# Patient Record
Sex: FEMALE | Race: BLACK OR AFRICAN AMERICAN | NOT HISPANIC OR LATINO
[De-identification: names, ages, dates, MRNs, and addresses within clinical notes are randomized per-mention and may not be internally consistent; named-entity substitution may affect disease eponyms.]

---

## 2017-04-02 ENCOUNTER — TRANSCRIPTION ENCOUNTER (OUTPATIENT)
Age: 62
End: 2017-04-02

## 2017-11-08 ENCOUNTER — TRANSCRIPTION ENCOUNTER (OUTPATIENT)
Age: 62
End: 2017-11-08

## 2019-02-11 ENCOUNTER — INPATIENT (INPATIENT)
Facility: HOSPITAL | Age: 64
LOS: 2 days | Discharge: HOME | End: 2019-02-14
Attending: FAMILY MEDICINE | Admitting: FAMILY MEDICINE
Payer: COMMERCIAL

## 2019-02-11 VITALS
TEMPERATURE: 100 F | SYSTOLIC BLOOD PRESSURE: 156 MMHG | DIASTOLIC BLOOD PRESSURE: 100 MMHG | RESPIRATION RATE: 16 BRPM | OXYGEN SATURATION: 97 % | HEART RATE: 113 BPM

## 2019-02-11 LAB
ALBUMIN SERPL ELPH-MCNC: 3.7 G/DL — SIGNIFICANT CHANGE UP (ref 3.3–5)
ALP SERPL-CCNC: 134 U/L — HIGH (ref 40–120)
ALT FLD-CCNC: 34 U/L — SIGNIFICANT CHANGE UP (ref 12–78)
ANION GAP SERPL CALC-SCNC: 9 MMOL/L — SIGNIFICANT CHANGE UP (ref 5–17)
APPEARANCE UR: CLEAR — SIGNIFICANT CHANGE UP
APTT BLD: 30.4 SEC — SIGNIFICANT CHANGE UP (ref 27.5–36.3)
AST SERPL-CCNC: 33 U/L — SIGNIFICANT CHANGE UP (ref 15–37)
BACTERIA # UR AUTO: NEGATIVE — SIGNIFICANT CHANGE UP
BASOPHILS # BLD AUTO: 0.03 K/UL — SIGNIFICANT CHANGE UP (ref 0–0.2)
BASOPHILS NFR BLD AUTO: 0.4 % — SIGNIFICANT CHANGE UP (ref 0–2)
BILIRUB SERPL-MCNC: 0.7 MG/DL — SIGNIFICANT CHANGE UP (ref 0.2–1.2)
BILIRUB UR-MCNC: NEGATIVE — SIGNIFICANT CHANGE UP
BUN SERPL-MCNC: 19 MG/DL — SIGNIFICANT CHANGE UP (ref 7–23)
CALCIUM SERPL-MCNC: 9.5 MG/DL — SIGNIFICANT CHANGE UP (ref 8.5–10.1)
CHLORIDE SERPL-SCNC: 99 MMOL/L — SIGNIFICANT CHANGE UP (ref 96–108)
CO2 SERPL-SCNC: 28 MMOL/L — SIGNIFICANT CHANGE UP (ref 22–31)
COLOR SPEC: YELLOW — SIGNIFICANT CHANGE UP
CREAT SERPL-MCNC: 1.23 MG/DL — SIGNIFICANT CHANGE UP (ref 0.5–1.3)
DIFF PNL FLD: ABNORMAL
EOSINOPHIL # BLD AUTO: 0.14 K/UL — SIGNIFICANT CHANGE UP (ref 0–0.5)
EOSINOPHIL NFR BLD AUTO: 1.8 % — SIGNIFICANT CHANGE UP (ref 0–6)
EPI CELLS # UR: NEGATIVE — SIGNIFICANT CHANGE UP
GLUCOSE SERPL-MCNC: 312 MG/DL — HIGH (ref 70–99)
GLUCOSE UR QL: 1000 MG/DL
HCT VFR BLD CALC: 43.8 % — SIGNIFICANT CHANGE UP (ref 34.5–45)
HGB BLD-MCNC: 14.8 G/DL — SIGNIFICANT CHANGE UP (ref 11.5–15.5)
IMM GRANULOCYTES NFR BLD AUTO: 0.8 % — SIGNIFICANT CHANGE UP (ref 0–1.5)
INR BLD: 0.93 RATIO — SIGNIFICANT CHANGE UP (ref 0.88–1.16)
KETONES UR-MCNC: ABNORMAL
LEUKOCYTE ESTERASE UR-ACNC: NEGATIVE — SIGNIFICANT CHANGE UP
LYMPHOCYTES # BLD AUTO: 1.09 K/UL — SIGNIFICANT CHANGE UP (ref 1–3.3)
LYMPHOCYTES # BLD AUTO: 14.4 % — SIGNIFICANT CHANGE UP (ref 13–44)
MCHC RBC-ENTMCNC: 29.1 PG — SIGNIFICANT CHANGE UP (ref 27–34)
MCHC RBC-ENTMCNC: 33.8 GM/DL — SIGNIFICANT CHANGE UP (ref 32–36)
MCV RBC AUTO: 86.2 FL — SIGNIFICANT CHANGE UP (ref 80–100)
MONOCYTES # BLD AUTO: 0.27 K/UL — SIGNIFICANT CHANGE UP (ref 0–0.9)
MONOCYTES NFR BLD AUTO: 3.6 % — SIGNIFICANT CHANGE UP (ref 2–14)
NEUTROPHILS # BLD AUTO: 5.99 K/UL — SIGNIFICANT CHANGE UP (ref 1.8–7.4)
NEUTROPHILS NFR BLD AUTO: 79 % — HIGH (ref 43–77)
NITRITE UR-MCNC: NEGATIVE — SIGNIFICANT CHANGE UP
NRBC # BLD: 0 /100 WBCS — SIGNIFICANT CHANGE UP (ref 0–0)
PH UR: 8 — SIGNIFICANT CHANGE UP (ref 5–8)
PLATELET # BLD AUTO: 136 K/UL — LOW (ref 150–400)
POTASSIUM SERPL-MCNC: 3.8 MMOL/L — SIGNIFICANT CHANGE UP (ref 3.5–5.3)
POTASSIUM SERPL-SCNC: 3.8 MMOL/L — SIGNIFICANT CHANGE UP (ref 3.5–5.3)
PROT SERPL-MCNC: 8.4 GM/DL — HIGH (ref 6–8.3)
PROT UR-MCNC: NEGATIVE — SIGNIFICANT CHANGE UP
PROTHROM AB SERPL-ACNC: 10.3 SEC — SIGNIFICANT CHANGE UP (ref 10–12.9)
RBC # BLD: 5.08 M/UL — SIGNIFICANT CHANGE UP (ref 3.8–5.2)
RBC # FLD: 12.2 % — SIGNIFICANT CHANGE UP (ref 10.3–14.5)
RBC CASTS # UR COMP ASSIST: ABNORMAL /HPF (ref 0–4)
SODIUM SERPL-SCNC: 136 MMOL/L — SIGNIFICANT CHANGE UP (ref 135–145)
SP GR SPEC: 1.01 — SIGNIFICANT CHANGE UP (ref 1.01–1.02)
TROPONIN I SERPL-MCNC: <0.015 NG/ML — SIGNIFICANT CHANGE UP (ref 0.01–0.04)
UROBILINOGEN FLD QL: NEGATIVE — SIGNIFICANT CHANGE UP
WBC # BLD: 7.58 K/UL — SIGNIFICANT CHANGE UP (ref 3.8–10.5)
WBC # FLD AUTO: 7.58 K/UL — SIGNIFICANT CHANGE UP (ref 3.8–10.5)
WBC UR QL: SIGNIFICANT CHANGE UP

## 2019-02-11 PROCEDURE — 99285 EMERGENCY DEPT VISIT HI MDM: CPT

## 2019-02-11 PROCEDURE — 93010 ELECTROCARDIOGRAM REPORT: CPT

## 2019-02-11 PROCEDURE — 70450 CT HEAD/BRAIN W/O DYE: CPT | Mod: 26

## 2019-02-11 PROCEDURE — 71045 X-RAY EXAM CHEST 1 VIEW: CPT | Mod: 26

## 2019-02-11 RX ORDER — ATORVASTATIN CALCIUM 80 MG/1
40 TABLET, FILM COATED ORAL AT BEDTIME
Qty: 0 | Refills: 0 | Status: DISCONTINUED | OUTPATIENT
Start: 2019-02-11 | End: 2019-02-14

## 2019-02-11 RX ORDER — ASPIRIN/CALCIUM CARB/MAGNESIUM 324 MG
81 TABLET ORAL DAILY
Qty: 0 | Refills: 0 | Status: DISCONTINUED | OUTPATIENT
Start: 2019-02-11 | End: 2019-02-14

## 2019-02-11 RX ORDER — ACETAMINOPHEN 500 MG
650 TABLET ORAL ONCE
Qty: 0 | Refills: 0 | Status: COMPLETED | OUTPATIENT
Start: 2019-02-11 | End: 2019-02-11

## 2019-02-11 RX ORDER — HEPARIN SODIUM 5000 [USP'U]/ML
5000 INJECTION INTRAVENOUS; SUBCUTANEOUS EVERY 8 HOURS
Qty: 0 | Refills: 0 | Status: DISCONTINUED | OUTPATIENT
Start: 2019-02-11 | End: 2019-02-14

## 2019-02-11 RX ORDER — ASPIRIN/CALCIUM CARB/MAGNESIUM 324 MG
325 TABLET ORAL ONCE
Qty: 0 | Refills: 0 | Status: COMPLETED | OUTPATIENT
Start: 2019-02-11 | End: 2019-02-11

## 2019-02-11 RX ADMIN — Medication 650 MILLIGRAM(S): at 19:23

## 2019-02-11 RX ADMIN — Medication 325 MILLIGRAM(S): at 20:03

## 2019-02-11 RX ADMIN — Medication 650 MILLIGRAM(S): at 20:06

## 2019-02-11 NOTE — ED PROVIDER NOTE - OBJECTIVE STATEMENT
64 y/o female with a PMHx of DM, hypothyroidism, HTN, CVA (41 years ago, taken off ASA) presents to the ED c/o HA x 2 days, worsening today. Pt reports she has been intermittently noncompliant with her HTN meds x2 months. Pt took Tylenol yesterday with relief of HA, no pain meds today. Yesterday, pt had trouble speaking, states she could not formulate a sentence. Daughter notes pt  sometimes be disoriented but this episode was different and longer in length. Denies CP or any other symptoms in ED. NKDA. PCP Dr. Hong

## 2019-02-11 NOTE — ED STATDOCS - PROGRESS NOTE DETAILS
Dimas URIBE for ED attending, Dr. Wilder: 62 y/o female with a PMHx of CVA, DM, HTN (not taking her HTN medications for the past 2 months) presents to the ED c/o word finding difficulty, disorientation, generalized weakness since yesterday. 2 days ago pt had episode of nausea, vomiting with a HA after eating eggs. Today pt had onset of right finger stiffness. No pain at time of evaluation. Will send pt to main ED for further evaluation.

## 2019-02-11 NOTE — ED ADULT NURSE REASSESSMENT NOTE - NS ED NURSE REASSESS COMMENT FT1
report received from previous RN. patient resting comfortably in stretcher in no acute distress. A&Ox4 but forgetful at times. patient reports she is in ED for confusion that began over the weekend and difficulty forming complete sentences. PERRL. moves all extremities with good strength. no facial droop or slurred speech noted. +headache. denies dizziness, blurred vision or numbness/tingling of the extremities. color good. skin warm and dry. respirations even and unlabored. sinus tachycardia 100s on CM. passed dysphagia screen. medicated as ordered. VSS. as per Dr. Abernathy, patient may eat. provided with sandwich. call bell within reach. family at bedside. pending repeat troponin.

## 2019-02-11 NOTE — ED ADULT TRIAGE NOTE - CHIEF COMPLAINT QUOTE
pt BIB daughter from home c/o disorientation, weakness x 3 days. last known well Friday. daughter reports pt woke up yesterday morning disoriented and word-searching but felt better throughout the day. pt now called daughter bc she feels 'unwell' but cannot elaborate. at triage, pt lethargic and weak.  strength equal bilaterally. no gross asymmetry noted. no code stroke assessed in setting of last known well. hx CVA 15 yrs ago.

## 2019-02-11 NOTE — H&P ADULT - NSHPPHYSICALEXAM_GEN_ALL_CORE
ICU Vital Signs Last 24 Hrs    T(F): 99.6 (11 Feb 2019 16:39), Max: 99.6 (11 Feb 2019 16:39)  HR: 108 (11 Feb 2019 19:23) (107 - 113)  BP: 150/85 (11 Feb 2019 19:23) (150/85 - 156/100)    RR: 17 (11 Feb 2019 19:23) (16 - 21)  SpO2: 98% (11 Feb 2019 19:23) (97% - 98%)

## 2019-02-11 NOTE — ED STATDOCS - NS_ ATTENDINGSCRIBEDETAILS _ED_A_ED_FT
Antwan Wilder DO (Attending): The history, relevant review of systems, past medical and surgical history, medical decision making, and physical examination was documented by the scribe in my presence and I attest to the accuracy of the documentation. I, Antwan Wilder DO, performed the initial face to face bedside interview with this patient regarding history of present illness and determined that the patient should be seen in the main ED.  The history, was documented by the scribe in my presence and I attest to the accuracy of the documentation.

## 2019-02-11 NOTE — H&P ADULT - HISTORY OF PRESENT ILLNESS
62 y/o woman with a PMHx of DM, hypothyroidism, HTN, CVA (41 years ago, taken off ASA) presents to the ED c/o HA x 2 days, worsening today. Pt reports she has been intermittently noncompliant with her HTN meds x2 months. Pt took Tylenol yesterday with relief of HA, no pain meds today. Yesterday, pt had trouble speaking, states she could not formulate a sentence. Daughter notes pt  sometimes be disoriented but this episode was different and longer in length. Denies CP or any other symptoms in ED. NKDA. Pt is a 62 y/o woman with a PMHx of DM, hypothyroidism, HTN, CVA (21 years ago, taken off ASA and coumadin 15 years ago) who presents to the ED c/o recurrent speech difficulty and Left index finger numbness , HA over the last  3 days. Pt had stopped taking all her meds when she went to [place] 6 months ago to bury her father.  Pt took Tylenol yesterday with relief of HA, no pain meds today.  Pt reports trouble speaking 2 days ago at 10am lasting 20-25 min, states she could not formulate a sentence.   This again occurred today with Left index numbess at 3pm , lasting 20 min Pt denies CP, palpations or any other symptoms.     Surg Hx:  WADE    FamHx:  Mother DM, HTN, Asthma,  in 60s  Father  at 74 of old age  Brother CVA at 51  Sister DM 51

## 2019-02-11 NOTE — ED ADULT NURSE REASSESSMENT NOTE - NS ED NURSE REASSESS COMMENT FT1
Dr. Abernathy states ok for family to give patient her night time medications from home. medication reconciliation completed in computer.

## 2019-02-11 NOTE — ED PROVIDER NOTE - NS_ ATTENDINGSCRIBEDETAILS _ED_A_ED_FT
I, Ghulam Abernathy MD,  performed the initial face to face bedside interview with this patient regarding history of present illness, review of symptoms and relevant past medical, social and family history.  I completed an independent physical examination.    The history, relevant review of systems, past medical and surgical history, medical decision making, and physical examination was documented by the scribe in my presence and I attest to the accuracy of the documentation.

## 2019-02-11 NOTE — H&P ADULT - ASSESSMENT
Pt is admitted w/    I. CVA Aphasia and headache, NIHSS 0  - adm to tele  - s/p ASA 325mg, add  statin  - Neurochecks  - echo  - fasting lipids    II. DM  - Insulin sliding scale    III. DVT prophylaxis  - heparin    IV. IMPROVE VTE Individual Risk Assessment    RISK                                                                Points    [  ] Previous VTE                                                  3    [  ] Thrombophilia                                               2    [  ] Lower limb paralysis                                      2        (unable to hold up >15 seconds)      [  ] Current Cancer                                              2         (within 6 months)    [  ] Immobilization > 24 hrs                                1    [  ] ICU/CCU stay > 24 hours                              1    [ X ] Age > 60                                                      1    IMPROVE VTE Score _______1__ Pt is a 62 y/o woman with a PMHx of DM, hypothyroidism, HTN, CVA (21 years ago, taken off ASA and coumadin 15 years ago) who presents to the ED c/o recurrent speech difficulty and Left index finger numbness , HA over the last  3 days. Pt had stopped taking all her meds when she went to [place] 6 months ago to bury her father.  Pt took Tylenol yesterday with relief of HA, no pain meds today.  Pt reports trouble speaking 2 days ago at 10am lasting 20-25 min, states she could not formulate a sentence.   This again occurred today with Left index numbess at 3pm , lasting 20 min Pt denies CP, palpations or any other symptoms.     Pt is admitted w/    I. CVA DDX TIA Aphasia and headache, NIHSS 0  - adm to tele  - s/p ASA 325mg in ED,   - resume   statin now  and plavix tomorrow, hold further ASA, as pt was not on any of her meds for 6 mo  - Neurochecks  - echo  - fasting lipids  - Neurology consult  - pt and  at bedside counseled re: compliance with meds, and risk of CVA and morbidity    II. DM  - Insulin sliding scale    III. DVT prophylaxis  - heparin    IV. IMPROVE VTE Individual Risk Assessment    RISK                                                                Points    [  ] Previous VTE                                                  3    [  ] Thrombophilia                                               2    [  ] Lower limb paralysis                                      2        (unable to hold up >15 seconds)      [  ] Current Cancer                                              2         (within 6 months)    [  ] Immobilization > 24 hrs                                1    [  ] ICU/CCU stay > 24 hours                              1    [ X ] Age > 60                                                      1    IMPROVE VTE Score _______1__    V. Meds  - pt needs assistance and review of meds with PCP required  - pt lists both labetalol and propranolol as well as 2 calcium channel blockers and 2 different doses of metformin

## 2019-02-12 LAB
CHOLEST SERPL-MCNC: 246 MG/DL — HIGH (ref 10–199)
GLUCOSE BLDC GLUCOMTR-MCNC: 219 MG/DL — HIGH (ref 70–99)
GLUCOSE BLDC GLUCOMTR-MCNC: 272 MG/DL — HIGH (ref 70–99)
GLUCOSE BLDC GLUCOMTR-MCNC: 299 MG/DL — HIGH (ref 70–99)
GLUCOSE BLDC GLUCOMTR-MCNC: 301 MG/DL — HIGH (ref 70–99)
GLUCOSE BLDC GLUCOMTR-MCNC: 363 MG/DL — HIGH (ref 70–99)
HBA1C BLD-MCNC: 15.4 % — HIGH (ref 4–5.6)
HCV AB S/CO SERPL IA: 0.1 S/CO — SIGNIFICANT CHANGE UP
HCV AB SERPL-IMP: SIGNIFICANT CHANGE UP
HDLC SERPL-MCNC: 40 MG/DL — LOW
LIPID PNL WITH DIRECT LDL SERPL: 160 MG/DL — HIGH
TOTAL CHOLESTEROL/HDL RATIO MEASUREMENT: 6.2 RATIO — SIGNIFICANT CHANGE UP (ref 3.3–7.1)
TRIGL SERPL-MCNC: 229 MG/DL — HIGH (ref 10–149)
TSH SERPL-MCNC: 6.66 UU/ML — HIGH (ref 0.34–4.82)

## 2019-02-12 PROCEDURE — 70551 MRI BRAIN STEM W/O DYE: CPT | Mod: 26

## 2019-02-12 PROCEDURE — 72198 MR ANGIO PELVIS W/O & W/DYE: CPT | Mod: 26

## 2019-02-12 PROCEDURE — 70544 MR ANGIOGRAPHY HEAD W/O DYE: CPT | Mod: 26,59

## 2019-02-12 RX ORDER — INSULIN GLARGINE 100 [IU]/ML
12 INJECTION, SOLUTION SUBCUTANEOUS EVERY MORNING
Qty: 0 | Refills: 0 | Status: DISCONTINUED | OUTPATIENT
Start: 2019-02-12 | End: 2019-02-13

## 2019-02-12 RX ORDER — LABETALOL HCL 100 MG
200 TABLET ORAL
Qty: 0 | Refills: 0 | Status: DISCONTINUED | OUTPATIENT
Start: 2019-02-12 | End: 2019-02-14

## 2019-02-12 RX ORDER — SODIUM CHLORIDE 9 MG/ML
1000 INJECTION, SOLUTION INTRAVENOUS
Qty: 0 | Refills: 0 | Status: DISCONTINUED | OUTPATIENT
Start: 2019-02-12 | End: 2019-02-12

## 2019-02-12 RX ORDER — INFLUENZA VIRUS VACCINE 15; 15; 15; 15 UG/.5ML; UG/.5ML; UG/.5ML; UG/.5ML
0.5 SUSPENSION INTRAMUSCULAR ONCE
Qty: 0 | Refills: 0 | Status: DISCONTINUED | OUTPATIENT
Start: 2019-02-12 | End: 2019-02-14

## 2019-02-12 RX ORDER — LOSARTAN POTASSIUM 100 MG/1
100 TABLET, FILM COATED ORAL DAILY
Qty: 0 | Refills: 0 | Status: DISCONTINUED | OUTPATIENT
Start: 2019-02-12 | End: 2019-02-14

## 2019-02-12 RX ORDER — SODIUM CHLORIDE 9 MG/ML
500 INJECTION INTRAMUSCULAR; INTRAVENOUS; SUBCUTANEOUS ONCE
Qty: 0 | Refills: 0 | Status: COMPLETED | OUTPATIENT
Start: 2019-02-12 | End: 2019-02-12

## 2019-02-12 RX ORDER — DEXTROSE 50 % IN WATER 50 %
25 SYRINGE (ML) INTRAVENOUS ONCE
Qty: 0 | Refills: 0 | Status: DISCONTINUED | OUTPATIENT
Start: 2019-02-12 | End: 2019-02-12

## 2019-02-12 RX ORDER — CLOPIDOGREL BISULFATE 75 MG/1
75 TABLET, FILM COATED ORAL DAILY
Qty: 0 | Refills: 0 | Status: DISCONTINUED | OUTPATIENT
Start: 2019-02-12 | End: 2019-02-14

## 2019-02-12 RX ORDER — AMLODIPINE BESYLATE 2.5 MG/1
10 TABLET ORAL DAILY
Qty: 0 | Refills: 0 | Status: DISCONTINUED | OUTPATIENT
Start: 2019-02-12 | End: 2019-02-14

## 2019-02-12 RX ORDER — LEVOTHYROXINE SODIUM 125 MCG
75 TABLET ORAL DAILY
Qty: 0 | Refills: 0 | Status: DISCONTINUED | OUTPATIENT
Start: 2019-02-12 | End: 2019-02-14

## 2019-02-12 RX ORDER — GLUCAGON INJECTION, SOLUTION 0.5 MG/.1ML
1 INJECTION, SOLUTION SUBCUTANEOUS ONCE
Qty: 0 | Refills: 0 | Status: DISCONTINUED | OUTPATIENT
Start: 2019-02-12 | End: 2019-02-12

## 2019-02-12 RX ORDER — INSULIN LISPRO 100/ML
VIAL (ML) SUBCUTANEOUS
Qty: 0 | Refills: 0 | Status: DISCONTINUED | OUTPATIENT
Start: 2019-02-12 | End: 2019-02-14

## 2019-02-12 RX ORDER — DEXTROSE 50 % IN WATER 50 %
15 SYRINGE (ML) INTRAVENOUS ONCE
Qty: 0 | Refills: 0 | Status: DISCONTINUED | OUTPATIENT
Start: 2019-02-12 | End: 2019-02-12

## 2019-02-12 RX ORDER — METFORMIN HYDROCHLORIDE 850 MG/1
1 TABLET ORAL
Qty: 0 | Refills: 0 | COMMUNITY

## 2019-02-12 RX ORDER — PROPRANOLOL HCL 160 MG
10 CAPSULE, EXTENDED RELEASE 24HR ORAL
Qty: 0 | Refills: 0 | Status: DISCONTINUED | OUTPATIENT
Start: 2019-02-12 | End: 2019-02-12

## 2019-02-12 RX ORDER — DEXTROSE 50 % IN WATER 50 %
12.5 SYRINGE (ML) INTRAVENOUS ONCE
Qty: 0 | Refills: 0 | Status: DISCONTINUED | OUTPATIENT
Start: 2019-02-12 | End: 2019-02-12

## 2019-02-12 RX ADMIN — Medication 4: at 17:20

## 2019-02-12 RX ADMIN — SODIUM CHLORIDE 250 MILLILITER(S): 9 INJECTION INTRAMUSCULAR; INTRAVENOUS; SUBCUTANEOUS at 13:12

## 2019-02-12 RX ADMIN — Medication 200 MILLIGRAM(S): at 18:28

## 2019-02-12 RX ADMIN — INSULIN GLARGINE 12 UNIT(S): 100 INJECTION, SOLUTION SUBCUTANEOUS at 17:20

## 2019-02-12 RX ADMIN — Medication 81 MILLIGRAM(S): at 12:31

## 2019-02-12 RX ADMIN — AMLODIPINE BESYLATE 10 MILLIGRAM(S): 2.5 TABLET ORAL at 06:02

## 2019-02-12 RX ADMIN — Medication 75 MICROGRAM(S): at 06:02

## 2019-02-12 RX ADMIN — HEPARIN SODIUM 5000 UNIT(S): 5000 INJECTION INTRAVENOUS; SUBCUTANEOUS at 14:51

## 2019-02-12 RX ADMIN — Medication 200 MILLIGRAM(S): at 06:03

## 2019-02-12 RX ADMIN — LOSARTAN POTASSIUM 100 MILLIGRAM(S): 100 TABLET, FILM COATED ORAL at 06:02

## 2019-02-12 RX ADMIN — Medication 3: at 08:39

## 2019-02-12 RX ADMIN — HEPARIN SODIUM 5000 UNIT(S): 5000 INJECTION INTRAVENOUS; SUBCUTANEOUS at 22:06

## 2019-02-12 RX ADMIN — ATORVASTATIN CALCIUM 40 MILLIGRAM(S): 80 TABLET, FILM COATED ORAL at 22:06

## 2019-02-12 RX ADMIN — CLOPIDOGREL BISULFATE 75 MILLIGRAM(S): 75 TABLET, FILM COATED ORAL at 12:31

## 2019-02-12 RX ADMIN — Medication 5: at 12:31

## 2019-02-12 NOTE — CONSULT NOTE ADULT - SUBJECTIVE AND OBJECTIVE BOX
Patient is a 63y old  Female who presents with a chief complaint of word finding difficulty (2019 09:28)    HPI:  Pt is a 64 y/o woman with a PMHx of DM, hypothyroidism, HTN, stroke (21 years ago, taken off ASA and coumadin 15 years ago) who presents to the ED for aphasia. at 4PM on 2/10/19 she noticed sudden onset trouble finding words. She had trouble reading and tried to write something and it was garbled and made no sense. This lasted 20 minutes with return to baseline. Same thing happened on 19 at 3:54 PM lasting 15 minutes.  She had no headache, numbness, tingling, weakness, trouble swallowing, dysarthria, vision change, loss of consciousness, fall. She has not been sick. She has baseline left visual field cut from old stroke.     Surg Hx:  WADE    FamHx:  Mother DM, HTN, Asthma,  in 60s  Father  at 74 of old age  Brother CVA at 51  Sister DM 51 (2019 21:09)    PAST MEDICAL & SURGICAL HISTORY:  HTN (hypertension)  DM (diabetes mellitus)  CVA (cerebral vascular accident): with residual loss of peripheral vision in Rt eye  DM (diabetes mellitus)  HTN (hypertension)  History of hysterectomy    FAMILY HISTORY: sister and brother with stroke     Social Hx: Nonsmoker, no drug or alcohol use    MEDICATIONS  (STANDING):  amLODIPine   Tablet 10 milliGRAM(s) Oral daily  aspirin enteric coated 81 milliGRAM(s) Oral daily  atorvastatin 40 milliGRAM(s) Oral at bedtime  clopidogrel Tablet 75 milliGRAM(s) Oral daily  heparin  Injectable 5000 Unit(s) SubCutaneous every 8 hours  influenza   Vaccine 0.5 milliLiter(s) IntraMuscular once  insulin lispro (HumaLOG) corrective regimen sliding scale   SubCutaneous three times a day before meals  labetalol 200 milliGRAM(s) Oral two times a day  levothyroxine 75 MICROGram(s) Oral daily  losartan 100 milliGRAM(s) Oral daily  propranolol 10 milliGRAM(s) Oral two times a day     Allergies  No Known Allergies    ROS: Pertinent positives in HPI, all other ROS were reviewed and are negative.      Vital Signs Last 24 Hrs  T(C): 37.1 (2019 10:27), Max: 37.6 (2019 16:39)  T(F): 98.8 (2019 10:27), Max: 99.6 (2019 16:39)  HR: 95 (2019 10:27) (88 - 113)  BP: 107/57 (2019 10:27) (107/57 - 156/100)  BP(mean): --  RR: 18 (2019 10:27) (16 - 21)  SpO2: 100% (2019 10:27) (95% - 100%)    Constitutional: awake and alert.  HEENT: PERRLA, EOMI, Fundi without papilledema   Neck: Supple.  Respiratory: Breath sounds are clear bilaterally  Cardiovascular: S1 and S2, regular rhythm  Gastrointestinal: soft, nontender  Extremities:  no edema  Musculoskeletal: no joint swelling/tenderness, no abnormal movements  Skin: No rashes    Neurological exam:  HF: A x O x 3. 3/3 immediate 1/3 delayed recall. Intact attention (spell world backward) Appropriately interactive, normal affect. Speech fluent, No Aphasia or paraphasic errors. Naming /repetition intact   CN: VAL, EOMI, LEFT hemifield cut (OLD); facial sensation normal, no NLFD, tongue midline, Palate moves equally, SCM equal bilaterally  Motor: No pronator drift, Strength 5/5 in all 4 ext, normal bulk and tone, no tremor, rigidity or bradykinesia.    Sens: Intact to light touch, vibration, temperature    Reflexes: 1+ in arms absent in both legs (achilles, and patellar)  Coord: FNF and THIAGO are normal and symmetric  Gait/Balance: Normal    Labs:       136  |  99  |  19  ----------------------------<  312<H>  3.8   |  28  |  1.23    Ca    9.5      2019 17:38    TPro  8.4<H>  /  Alb  3.7  /  TBili  0.7  /  DBili  x   /  AST  33  /  ALT  34  /  AlkPhos  134<H>        02-12 [id number] 15.4               14.8   7.58  )-----------( 136      ( 2019 17:38 )             43.8    Radiology:  - CT Head 19: old right PCA territory infarct along with mild Emerald vessel ischemic changes throughout the white matter of both hemispheres. No acute abnormality suggested. Lacunar infarcts also noted in the brainstem.  - MRI brain: pending   - MRA head and neck: pending     A/P: this is a 63 year old woman with stroke (R PCA), poorly controlled DM (a1c 15.4), HTN presenting with two episodes of aphasia concerning for stuttering TIA vs stroke. She has several stroke risk factors including very poorly controlled diabetes, HTN, prior history of stroke which need to be optimized.     - MRI Brain   - MRA head and neck   - Lipid panel   - TTE with bubble study   - Long term cardiac monitor for afib   - Atorvastatin 40mg OK for now - if LDL very high will change to 80mg - goal LDL is < 70  - Diabetes control   - BP to normotensive   - Aspirin 81mg     Mangement d/w Pt / family

## 2019-02-12 NOTE — PHYSICAL THERAPY INITIAL EVALUATION ADULT - PERTINENT HX OF CURRENT PROBLEM, REHAB EVAL
64 yo F admitted  c/o recurrent speech difficulty and Left index finger numbness , HA over the last  3 days. Pt had stopped taking all her meds when she went to [place] 6 months ago to bury her father.  CT Head (-) acute changes. (+) old infarcts. MRI/S: ordered.

## 2019-02-12 NOTE — CONSULT NOTE ADULT - SUBJECTIVE AND OBJECTIVE BOX
Cardiology Consultation    HPI: Pt is a 64 y/o woman with a PMHx of DM, hypothyroidism, HTN, CVA (21 years ago, taken off ASA and coumadin 15 years ago) who presents to the ED c/o recurrent speech difficulty and Left index finger numbness , HA over the last  3 days. Pt had stopped taking all her meds when she went to [place] 6 months ago to bury her father.  Pt took Tylenol yesterday with relief of HA, no pain meds today.  Pt reports trouble speaking 2 days ago at 10am lasting 20-25 min, states she could not formulate a sentence.   This again occurred today with Left index numbess at 3pm , lasting 20 min Pt denies CP, palpations or any other symptoms.     . Case d/w pt and family at bedside. No CP/SOB.   Aphasia, confusion improved back to baseline. CTH negative for acute findings.   Tele- SR.    Surg Hx:  WADE    FamHx:  Mother DM, HTN, Asthma,  in 60s  Father  at 74 of old age  Brother CVA at 51  Sister DM 51 (2019 21:09)    PAST MEDICAL & SURGICAL HISTORY:  CVA (cerebral vascular accident)  HTN (hypertension)  DM (diabetes mellitus)    Allergies  No Known Allergies    SOCIAL HISTORY: Denies tobacco, etoh abuse or illicit drug use    FAMILY HISTORY: Noncontributory    MEDICATIONS  (STANDING):  amLODIPine   Tablet 10 milliGRAM(s) Oral daily  aspirin enteric coated 81 milliGRAM(s) Oral daily  atorvastatin 40 milliGRAM(s) Oral at bedtime  clopidogrel Tablet 75 milliGRAM(s) Oral daily  heparin  Injectable 5000 Unit(s) SubCutaneous every 8 hours  influenza   Vaccine 0.5 milliLiter(s) IntraMuscular once  insulin lispro (HumaLOG) corrective regimen sliding scale   SubCutaneous three times a day before meals  labetalol 200 milliGRAM(s) Oral two times a day  levothyroxine 75 MICROGram(s) Oral daily  losartan 100 milliGRAM(s) Oral daily  propranolol 10 milliGRAM(s) Oral two times a day    MEDICATIONS  (PRN):    Vital Signs Last 24 Hrs  T(C): 36.2 (2019 05:57), Max: 37.6 (2019 16:39)  T(F): 97.2 (2019 05:57), Max: 99.6 (2019 16:39)  HR: 88 (2019 05:57) (88 - 113)  BP: 145/80 (2019 05:57) (120/94 - 156/100)  BP(mean): --  RR: 18 (2019 05:57) (16 - 21)  SpO2: 95% (2019 05:57) (95% - 99%)    REVIEW OF SYSTEMS:    CONSTITUTIONAL:  As per HPI.  HEENT:  Eyes:  No diplopia or blurred vision. ENT:  No earache, sore throat or runny nose.  CARDIOVASCULAR:  No pressure, squeezing, strangling, tightness, heaviness or aching about the chest, neck, axilla or epigastrium.  RESPIRATORY:  No cough, shortness of breath, PND or orthopnea.  GASTROINTESTINAL:  No nausea, vomiting or diarrhea.  GENITOURINARY:  No dysuria, frequency or urgency.  MUSCULOSKELETAL:  As per HPI.  SKIN:  No change in skin, hair or nails.  NEUROLOGIC:  No paresthesias, fasciculations, seizures or weakness.    PHYSICAL EXAMINATION:    GENERAL APPEARANCE:  Pt. is not currently dyspneic, in no distress. Pt. is alert, oriented, and pleasant.  HEENT:  Pupils are normal and react normally. No icterus. Mucous membranes well colored.  NECK:  Supple. No lymphadenopathy. Jugular venous pressure not elevated. Carotids equal.   HEART:   The cardiac impulse has a normal quality. There are no murmurs, rubs or gallops noted  CHEST:  Chest is clear to auscultation. Normal respiratory effort.  ABDOMEN:  Soft and nontender.   EXTREMITIES:  There is no edema.     I&O's Summary      LABS:                        14.8   7.58  )-----------( 136      ( 2019 17:38 )             43.8         136  |  99  |  19  ----------------------------<  312<H>  3.8   |  28  |  1.23    Ca    9.5      2019 17:38    TPro  8.4<H>  /  Alb  3.7  /  TBili  0.7  /  DBili  x   /  AST  33  /  ALT  34  /  AlkPhos  134<H>  02-11    LIVER FUNCTIONS - ( 2019 17:38 )  Alb: 3.7 g/dL / Pro: 8.4 gm/dL / ALK PHOS: 134 U/L / ALT: 34 U/L / AST: 33 U/L / GGT: x           PT/INR - ( 2019 17:38 )   PT: 10.3 sec;   INR: 0.93 ratio       PTT - ( 2019 17:38 )  PTT:30.4 sec  CARDIAC MARKERS ( 2019 20:12 )  <0.015 ng/mL / x     / x     / x     / x      CARDIAC MARKERS ( 2019 17:38 )  <0.015 ng/mL / x     / x     / x     / x        Urinalysis Basic - ( 2019 17:38 )    Color: Yellow / Appearance: Clear / S.010 / pH: x  Gluc: x / Ketone: Small  / Bili: Negative / Urobili: Negative   Blood: x / Protein: Negative / Nitrite: Negative   Leuk Esterase: Negative / RBC: 3-5 /HPF / WBC 0-2   Sq Epi: x / Non Sq Epi: Negative / Bacteria: Negative     EKG: Stach @ 107. RBBB. LVH. Nonspecific ST changes.     TELEMETRY: SR    CARDIAC TESTS: pending    RADIOLOGY & ADDITIONAL STUDIES: < from: CT Head No Cont (19 @ 18:22) >  1)  old right PCA territory infarct along with mild Emerald vessel ischemic   changes throughout the white matter of both hemispheres. No acute   abnormality suggested. Lacunar infarcts also noted in the brainstem.  2)  clear sinuses and mastoids..    ASSESSMENT & PLAN:

## 2019-02-12 NOTE — PHYSICAL THERAPY INITIAL EVALUATION ADULT - MODALITIES TREATMENT COMMENTS
Patient returned to bed by request, call bell in reach and spouse at bedside.  Patient independent in functional mobility, no skilled physical therapy need in this setting.  May ambulate per nursing.  Will d/c from PT. RN, CM informed.

## 2019-02-12 NOTE — CONSULT NOTE ADULT - ASSESSMENT
A/P: 63F PMHx of DM, hypothyroidism, HTN, CVA (21 years ago, taken off ASA and coumadin 15 years ago) who presents to the ED c/o recurrent speech difficulty and Left index finger numbness.    1. Aphasia. Possible TIA/CVA. CTH shows old infarct but no acute findings.   Cont asa/statin. Awaiting MRI/MRA. Neuro consult pendings.   Holding on ANGELITO unless embolic findings seen.   May need LINQ placed prior to discharge as this is second event.   2Decho pending. Cont monitoring on tele.   Tight BP control. Would benefit from outpt records.     2. HTN. Pt on multiple antihypertensives- cont current regimen as BP appears stable.     3. Dyslipidemia. Cont statin.     4. Trops negative, no active CP. Can have outpt ischemic eval upon discharge.     5. DVT proph, PT eval.

## 2019-02-12 NOTE — PROGRESS NOTE ADULT - SUBJECTIVE AND OBJECTIVE BOX
HOSPITAL COURSE AND ASSESSMENT  64 y/o woman with a PMH of DM, hypothyroidism, HTN, CVA (21 years ago, taken off ASA and coumadin 15 years ago) who presents to the ED c/o recurrent speech difficulty and Left index finger numbness , HA over the last  3 days. Pt had stopped taking all her meds when she went to [place] 6 months ago to bury her father.  Pt took Tylenol yesterday with relief of HA, no pain meds today.  Pt reports trouble speaking 2 days ago at 10am lasting 20-25 min, states she could not formulate a sentence.   This again occurred today with Left index numbess at 3pm , lasting 20 min Pt denies CP, palpations or any other symptoms.     In ED, pulse 113, /100. Medicine was asked for evaluation. Pt was admitted to telemetry for CVA evaluation. Neurology was involved.         *CVA DDX TIA Aphasia and headache, NIHSS 0  - CT head: old right PCA territory infarct with mild ischemic changes both hemispheres. Lacunar infarcts brainstem.  - MRI/MRA  - Risk factor evaluation telemetry, A1C, lipids, echo  - s/p ASA 325mg in ED. plvix/statin  -PT evaluation  -dysphagia screen: passed  - Neurology consult  - counseled re: compliance with meds, and risk of CVA and morbidity    *DM type II with hyperglycemia and vascular complication  - Insulin sliding scale    *Hematuria  -follow as outpatient    *Hypothyroidism with medication noncompliance    *DVT prophylaxis  - heparin    ----------------------------------------------------------------------------------------------  CHIEF COMPLAINT:  numbness    SUBJECTIVE:     tolerating swallow screen. family at bedside.. OOB.     REVIEW OF SYSTEMS:  All other review of systems is negative unless indicated above    Vital Signs Last 24 Hrs  T(C): 36.2 (12 Feb 2019 05:57), Max: 37.6 (11 Feb 2019 16:39)  T(F): 97.2 (12 Feb 2019 05:57), Max: 99.6 (11 Feb 2019 16:39)  HR: 88 (12 Feb 2019 05:57) (88 - 113)  BP: 145/80 (12 Feb 2019 05:57) (120/94 - 156/100)  BP(mean): --  RR: 18 (12 Feb 2019 05:57) (16 - 21)  SpO2: 95% (12 Feb 2019 05:57) (95% - 99%)  CAPILLARY BLOOD GLUCOSE      POCT Blood Glucose.: 272 mg/dL (12 Feb 2019 08:03)  POCT Blood Glucose.: 299 mg/dL (12 Feb 2019 00:25)  POCT Blood Glucose.: 294 mg/dL (11 Feb 2019 16:57)      PHYSICAL EXAM:  Constitutional: NAD, NCAT  HEENT: EOMI, Normal Hearing, MMM, fair dentition  Neck: trachea midline, No JVD  Respiratory: Breath sounds are clear, unlabored respiration  Cardiovascular: S1 and S2, regular rate   Gastrointestinal: Bowel Sounds present, soft, nontender, nondistended  Extremities: No peripheral edema  Vascular: 2+ radial pulse  Neurological: awake, alert, follows commands, sensation grossly intact  Psych: appropriate affect,  insight  Musculoskeletal: moves all extremities  Skin: No rashes    ALLERGIES  No Known Allergies      MEDICATIONS  (STANDING):  amLODIPine   Tablet 10 milliGRAM(s) Oral daily  aspirin enteric coated 81 milliGRAM(s) Oral daily  atorvastatin 40 milliGRAM(s) Oral at bedtime  clopidogrel Tablet 75 milliGRAM(s) Oral daily  heparin  Injectable 5000 Unit(s) SubCutaneous every 8 hours  influenza   Vaccine 0.5 milliLiter(s) IntraMuscular once  insulin lispro (HumaLOG) corrective regimen sliding scale   SubCutaneous three times a day before meals  labetalol 200 milliGRAM(s) Oral two times a day  levothyroxine 75 MICROGram(s) Oral daily  losartan 100 milliGRAM(s) Oral daily  propranolol 10 milliGRAM(s) Oral two times a day      LABS: All Labs Reviewed:                        14.8   7.58  )-----------( 136      ( 11 Feb 2019 17:38 )             43.8     02-11    136  |  99  |  19  ----------------------------<  312<H>  3.8   |  28  |  1.23    Ca    9.5      11 Feb 2019 17:38    TPro  8.4<H>  /  Alb  3.7  /  TBili  0.7  /  DBili  x   /  AST  33  /  ALT  34  /  AlkPhos  134<H>  02-11    PT/INR - ( 11 Feb 2019 17:38 )   PT: 10.3 sec;   INR: 0.93 ratio         PTT - ( 11 Feb 2019 17:38 )  PTT:30.4 sec  CARDIAC MARKERS ( 11 Feb 2019 20:12 )  <0.015 ng/mL / x     / x     / x     / x      CARDIAC MARKERS ( 11 Feb 2019 17:38 )  <0.015 ng/mL / x     / x     / x     / x          Blood Culture:

## 2019-02-12 NOTE — PHARMACOTHERAPY INTERVENTION NOTE - COMMENTS
Obtained medication history from patient's daughter; confirmed with medication bottles. Discussed duplications with Dr. Mckeon, (amlodipine and felodipine/labetalol and propranolol)

## 2019-02-13 LAB
GLUCOSE BLDC GLUCOMTR-MCNC: 174 MG/DL — HIGH (ref 70–99)
GLUCOSE BLDC GLUCOMTR-MCNC: 213 MG/DL — HIGH (ref 70–99)
GLUCOSE BLDC GLUCOMTR-MCNC: 269 MG/DL — HIGH (ref 70–99)
GLUCOSE BLDC GLUCOMTR-MCNC: 275 MG/DL — HIGH (ref 70–99)

## 2019-02-13 RX ORDER — HUMAN INSULIN 100 [IU]/ML
8 INJECTION, SUSPENSION SUBCUTANEOUS ONCE
Qty: 0 | Refills: 0 | Status: COMPLETED | OUTPATIENT
Start: 2019-02-13 | End: 2019-02-13

## 2019-02-13 RX ORDER — ACETAMINOPHEN 500 MG
650 TABLET ORAL ONCE
Qty: 0 | Refills: 0 | Status: COMPLETED | OUTPATIENT
Start: 2019-02-13 | End: 2019-02-13

## 2019-02-13 RX ORDER — INSULIN LISPRO 100/ML
7 VIAL (ML) SUBCUTANEOUS
Qty: 0 | Refills: 0 | Status: DISCONTINUED | OUTPATIENT
Start: 2019-02-13 | End: 2019-02-14

## 2019-02-13 RX ORDER — INSULIN GLARGINE 100 [IU]/ML
20 INJECTION, SOLUTION SUBCUTANEOUS EVERY MORNING
Qty: 0 | Refills: 0 | Status: DISCONTINUED | OUTPATIENT
Start: 2019-02-13 | End: 2019-02-14

## 2019-02-13 RX ADMIN — Medication 650 MILLIGRAM(S): at 06:50

## 2019-02-13 RX ADMIN — HEPARIN SODIUM 5000 UNIT(S): 5000 INJECTION INTRAVENOUS; SUBCUTANEOUS at 05:53

## 2019-02-13 RX ADMIN — ATORVASTATIN CALCIUM 40 MILLIGRAM(S): 80 TABLET, FILM COATED ORAL at 22:35

## 2019-02-13 RX ADMIN — HEPARIN SODIUM 5000 UNIT(S): 5000 INJECTION INTRAVENOUS; SUBCUTANEOUS at 12:20

## 2019-02-13 RX ADMIN — AMLODIPINE BESYLATE 10 MILLIGRAM(S): 2.5 TABLET ORAL at 05:53

## 2019-02-13 RX ADMIN — Medication 3: at 08:10

## 2019-02-13 RX ADMIN — Medication 650 MILLIGRAM(S): at 06:10

## 2019-02-13 RX ADMIN — CLOPIDOGREL BISULFATE 75 MILLIGRAM(S): 75 TABLET, FILM COATED ORAL at 12:16

## 2019-02-13 RX ADMIN — HEPARIN SODIUM 5000 UNIT(S): 5000 INJECTION INTRAVENOUS; SUBCUTANEOUS at 22:35

## 2019-02-13 RX ADMIN — Medication 200 MILLIGRAM(S): at 17:35

## 2019-02-13 RX ADMIN — Medication 2: at 17:16

## 2019-02-13 RX ADMIN — HUMAN INSULIN 8 UNIT(S): 100 INJECTION, SUSPENSION SUBCUTANEOUS at 19:07

## 2019-02-13 RX ADMIN — Medication 2: at 17:35

## 2019-02-13 RX ADMIN — INSULIN GLARGINE 12 UNIT(S): 100 INJECTION, SOLUTION SUBCUTANEOUS at 08:21

## 2019-02-13 RX ADMIN — Medication 75 MICROGRAM(S): at 05:53

## 2019-02-13 RX ADMIN — Medication 200 MILLIGRAM(S): at 05:53

## 2019-02-13 RX ADMIN — Medication 7 UNIT(S): at 17:35

## 2019-02-13 RX ADMIN — LOSARTAN POTASSIUM 100 MILLIGRAM(S): 100 TABLET, FILM COATED ORAL at 05:53

## 2019-02-13 RX ADMIN — Medication 81 MILLIGRAM(S): at 12:16

## 2019-02-13 NOTE — SWALLOW BEDSIDE ASSESSMENT ADULT - COMMENTS
The patient was admitted to  with recurrent episodes of verbally expressing herself which were accompanied by left index finger numbness. Imaging(head CT) in hospital was notable for old right PCA infarcts/old lacunar brainstem infarcts. While in hospital, hematuria was noted. This profile is superimposed upon a history of HTN, DM, hypothyroidism, previous hysterectomy and past remote CVA. The patient was admitted to  with recurrent episodes of difficulties verbally expressing herself/difficulties attempting to write words which were accompanied by left index finger numbness. Imaging(head CT) in hospital was notable for old right PCA infarcts/old lacunar brainstem infarcts. While in hospital, hematuria was noted. This profile is superimposed upon a history of HTN, DM, hypothyroidism, previous hysterectomy and past remote CVA.

## 2019-02-13 NOTE — CONSULT NOTE ADULT - ASSESSMENT
64 yo F with PMHx DM2, HTN, CVA 15yrs ago, hypothyroidism, medication non-compliance x 6 months p/w recurrent speech difficulty and Left index finger numbness , HA over the last 3 days; found to have posterior left frontal infarct.   EP consult placed to evaluate for LINQ placement.    1. Acute CVA  - MRI revealed tiny posterior left frontal infarct  - Follow up Neurology recommendations  - Pending ANGELITO tomorrow, will continue to follow for results with possible LINQ placement   - c/w BASA, Plavix, high intensity statin

## 2019-02-13 NOTE — PROVIDER CONTACT NOTE (OTHER) - SITUATION
Dr. Landrum aware of consult
OFFICE AWARE OF CONSULT
couldn't find an active number to contact physician
spoke to patient because office line was busy to verify office phone number; patient stated daughter called already to verify patients admittance to hospital

## 2019-02-13 NOTE — SWALLOW BEDSIDE ASSESSMENT ADULT - SWALLOW EVAL: RECOMMENDED DIET
SUGGEST A REGULAR TEXTURE DIET WITH THIN LIQUID CONSISTENCIES AS THE PATIENT TOLERATES THE SAME FROM AN OROPHARYNGEAL SWALLOWING STANCE ON CLINICAL EXAM.

## 2019-02-13 NOTE — PROGRESS NOTE ADULT - ASSESSMENT
A/P: 63F PMHx of DM, hypothyroidism, HTN, CVA (21 years ago, taken off ASA and coumadin 15 years ago) who presents to the ED c/o recurrent speech difficulty and Left index finger numbness.    1. Aphasia. Possible TIA/CVA. CTH shows old infarct but no acute findings.   MR brain with small, acute frontal lobe infarct.   Cont asa/statin/plavix. Neuro following.  Will have ANGELITO in AM- keep NPO after MN tonight.   EP called for LINQ placement- long term monitoring.   2Decho pending. Cont monitoring on tele.   Tight BP control. Would benefit from outpt records.     2. HTN. Pt on multiple antihypertensives- cont current regimen as BP appears stable.     3. Dyslipidemia. Cont statin.     4. Trops negative, no active CP. Can have outpt ischemic eval upon discharge.     5. DVT proph, PT eval.

## 2019-02-13 NOTE — SWALLOW BEDSIDE ASSESSMENT ADULT - SLP GENERAL OBSERVATIONS
The pt was alert and interactive. She reported that she experienced 2 separate episodes where she was unable to pronounce words or write that precipitated this admission but now resolved. At the present time, she is oriented x3+ and able to verbalize during communicative probes as well as in conversation without evidence of a primary motor speech or primary linguistic pathology. The pt is able to effectively verbalize her needs and feels she is at communicative baseline. Note that the pt denied Dysphagia when asked.

## 2019-02-13 NOTE — PROGRESS NOTE ADULT - SUBJECTIVE AND OBJECTIVE BOX
Cardiology Progress Note    HPI: Pt is a 64 y/o woman with a PMHx of DM, hypothyroidism, HTN, CVA (21 years ago, taken off ASA and coumadin 15 years ago) who presents to the ED c/o recurrent speech difficulty and Left index finger numbness , HA over the last  3 days. Pt had stopped taking all her meds when she went to [place] 6 months ago to bury her father.  Pt took Tylenol yesterday with relief of HA, no pain meds today.  Pt reports trouble speaking 2 days ago at 10am lasting 20-25 min, states she could not formulate a sentence.   This again occurred today with Left index numbess at 3pm , lasting 20 min Pt denies CP, palpations or any other symptoms.     . Case d/w pt and family at bedside. No CP/SOB.   Aphasia, confusion improved back to baseline. CTH negative for acute findings.   Tele- SR.    . Frontal lobe infarct seen on MRI. No CP/SOB. No events last pm.   SR on tele.     Surg Hx:  WADE    FamHx:  Mother DM, HTN, Asthma,  in 60s  Father  at 74 of old age  Brother CVA at 51  Sister DM 51 (2019 21:09)    PAST MEDICAL & SURGICAL HISTORY:  CVA (cerebral vascular accident)  HTN (hypertension)  DM (diabetes mellitus)    Allergies  No Known Allergies    SOCIAL HISTORY: Denies tobacco, etoh abuse or illicit drug use    FAMILY HISTORY: Noncontributory    MEDICATIONS  (STANDING):  amLODIPine   Tablet 10 milliGRAM(s) Oral daily  aspirin enteric coated 81 milliGRAM(s) Oral daily  atorvastatin 40 milliGRAM(s) Oral at bedtime  clopidogrel Tablet 75 milliGRAM(s) Oral daily  heparin  Injectable 5000 Unit(s) SubCutaneous every 8 hours  influenza   Vaccine 0.5 milliLiter(s) IntraMuscular once  insulin lispro (HumaLOG) corrective regimen sliding scale   SubCutaneous three times a day before meals  labetalol 200 milliGRAM(s) Oral two times a day  levothyroxine 75 MICROGram(s) Oral daily  losartan 100 milliGRAM(s) Oral daily  propranolol 10 milliGRAM(s) Oral two times a day    MEDICATIONS  (PRN):    Vital Signs Last 24 Hrs  T(C): 36.2 (2019 05:57), Max: 37.6 (2019 16:39)  T(F): 97.2 (2019 05:57), Max: 99.6 (2019 16:39)  HR: 88 (2019 05:57) (88 - 113)  BP: 145/80 (2019 05:57) (120/94 - 156/100)  BP(mean): --  RR: 18 (2019 05:57) (16 - 21)  SpO2: 95% (2019 05:57) (95% - 99%)    REVIEW OF SYSTEMS:    CONSTITUTIONAL:  As per HPI.  HEENT:  Eyes:  No diplopia or blurred vision. ENT:  No earache, sore throat or runny nose.  CARDIOVASCULAR:  No pressure, squeezing, strangling, tightness, heaviness or aching about the chest, neck, axilla or epigastrium.  RESPIRATORY:  No cough, shortness of breath, PND or orthopnea.  GASTROINTESTINAL:  No nausea, vomiting or diarrhea.  GENITOURINARY:  No dysuria, frequency or urgency.  MUSCULOSKELETAL:  As per HPI.  SKIN:  No change in skin, hair or nails.  NEUROLOGIC:  No paresthesias, fasciculations, seizures or weakness.    PHYSICAL EXAMINATION:    GENERAL APPEARANCE:  Pt. is not currently dyspneic, in no distress. Pt. is alert, oriented, and pleasant.  HEENT:  Pupils are normal and react normally. No icterus. Mucous membranes well colored.  NECK:  Supple. No lymphadenopathy. Jugular venous pressure not elevated. Carotids equal.   HEART:   The cardiac impulse has a normal quality. There are no murmurs, rubs or gallops noted  CHEST:  Chest is clear to auscultation. Normal respiratory effort.  ABDOMEN:  Soft and nontender.   EXTREMITIES:  There is no edema.     I&O's Summary      LABS:                        14.8   7.58  )-----------( 136      ( 2019 17:38 )             43.8     02-11    136  |  99  |  19  ----------------------------<  312<H>  3.8   |  28  |  1.23    Ca    9.5      2019 17:38    TPro  8.4<H>  /  Alb  3.7  /  TBili  0.7  /  DBili  x   /  AST  33  /  ALT  34  /  AlkPhos  134<H>  02-11    LIVER FUNCTIONS - ( 2019 17:38 )  Alb: 3.7 g/dL / Pro: 8.4 gm/dL / ALK PHOS: 134 U/L / ALT: 34 U/L / AST: 33 U/L / GGT: x           PT/INR - ( 2019 17:38 )   PT: 10.3 sec;   INR: 0.93 ratio       PTT - ( 2019 17:38 )  PTT:30.4 sec  CARDIAC MARKERS ( 2019 20:12 )  <0.015 ng/mL / x     / x     / x     / x      CARDIAC MARKERS ( 2019 17:38 )  <0.015 ng/mL / x     / x     / x     / x        Urinalysis Basic - ( 2019 17:38 )    Color: Yellow / Appearance: Clear / S.010 / pH: x  Gluc: x / Ketone: Small  / Bili: Negative / Urobili: Negative   Blood: x / Protein: Negative / Nitrite: Negative   Leuk Esterase: Negative / RBC: 3-5 /HPF / WBC 0-2   Sq Epi: x / Non Sq Epi: Negative / Bacteria: Negative     EKG: Stach @ 107. RBBB. LVH. Nonspecific ST changes.     TELEMETRY: SR    CARDIAC TESTS: pending    RADIOLOGY & ADDITIONAL STUDIES: < from: CT Head No Cont (19 @ 18:22) >  1)  old right PCA territory infarct along with mild Emerald vessel ischemic   changes throughout the white matter of both hemispheres. No acute   abnormality suggested. Lacunar infarcts also noted in the brainstem.  2)  clear sinuses and mastoids..  < from: MR Head No Cont (19 @ 18:17) >  IMPRESSION: Acute tiny posterior left frontal lobe infarct.  MR neck- negative.     ASSESSMENT & PLAN:

## 2019-02-13 NOTE — PROGRESS NOTE ADULT - SUBJECTIVE AND OBJECTIVE BOX
HOSPITAL COURSE AND ASSESSMENT  64 y/o woman with a PMH of DM, hypothyroidism, HTN, CVA (21 years ago, taken off ASA and coumadin 15 years ago) who presents to the ED c/o recurrent speech difficulty and Left index finger numbness , HA over the last  3 days. Pt had stopped taking all her meds when she went to [place] 6 months ago to bury her father.  Pt took Tylenol yesterday with relief of HA, no pain meds today.  Pt reports trouble speaking 2 days ago at 10am lasting 20-25 min, states she could not formulate a sentence.   This again occurred today with Left index numbess at 3pm , lasting 20 min Pt denies CP, palpations or any other symptoms.     In ED, pulse 113, /100. Medicine was asked for evaluation. Pt was admitted to telemetry for CVA evaluation. Neurology was involved. ANGELITO 2/14 with LINQ placement        *CVA DDX TIA Aphasia and headache, NIHSS 0  - CT head: old right PCA territory infarct with mild ischemic changes both hemispheres. Lacunar infarcts brainstem.  - MRI/MRA: very small left posterior frontal infarct, intracranial atherosclerosis  - Risk factor evaluation telemetry, A1C, lipids, echo  - s/p ASA 325mg in ED. plavix/statin  -ANGELITO and LINQ in AM  -PT evaluation  -dysphagia screen: passed  - Neurology consult  - counseled re: compliance with meds, and risk of CVA and morbidity    *DM type II with hyperglycemia and vascular complication  - A1C 15  -lantus, dose increased, add mealtime coverage today  -Insulin sliding scale    *Hematuria  -follow as outpatient    *Hypothyroidism with medication noncompliance    *DVT prophylaxis  - heparin      ----------------------------------------------------------------------------------------------  CHIEF COMPLAINT:  stroke    SUBJECTIVE:     tolerating PO. OOB. wants to go home. agreeable to 4 shots of insulin daily.     REVIEW OF SYSTEMS:  All other review of systems is negative unless indicated above    Vital Signs Last 24 Hrs  T(C): 36.7 (13 Feb 2019 10:51), Max: 37.1 (13 Feb 2019 05:10)  T(F): 98 (13 Feb 2019 10:51), Max: 98.8 (13 Feb 2019 05:10)  HR: 86 (13 Feb 2019 10:51) (81 - 89)  BP: 126/65 (13 Feb 2019 10:51) (120/72 - 141/78)  BP(mean): --  RR: 17 (13 Feb 2019 10:51) (16 - 17)  SpO2: 99% (13 Feb 2019 10:51) (99% - 100%)  CAPILLARY BLOOD GLUCOSE      POCT Blood Glucose.: 269 mg/dL (13 Feb 2019 11:35)  POCT Blood Glucose.: 275 mg/dL (13 Feb 2019 07:33)  POCT Blood Glucose.: 219 mg/dL (12 Feb 2019 21:28)  POCT Blood Glucose.: 301 mg/dL (12 Feb 2019 17:17)  POCT Blood Glucose.: 363 mg/dL (12 Feb 2019 12:15)      PHYSICAL EXAM:  Constitutional: NAD, NCAT  HEENT: EOMI, Normal Hearing, MMM, fair dentition  Neck: trachea midline, No JVD  Respiratory: Breath sounds are clear, unlabored respiration  Cardiovascular: S1 and S2, regular rate   Gastrointestinal: Bowel Sounds present, soft, nontender, nondistended  Extremities: No peripheral edema  Vascular: 2+ radial pulse  Neurological: awake, alert, follows commands, sensation grossly intact  Psych: appropriate affect,  insight  Musculoskeletal: moves all extremities  Skin: No rashes    ALLERGIES  No Known Allergies      MEDICATIONS  (STANDING):  amLODIPine   Tablet 10 milliGRAM(s) Oral daily  aspirin enteric coated 81 milliGRAM(s) Oral daily  atorvastatin 40 milliGRAM(s) Oral at bedtime  clopidogrel Tablet 75 milliGRAM(s) Oral daily  heparin  Injectable 5000 Unit(s) SubCutaneous every 8 hours  influenza   Vaccine 0.5 milliLiter(s) IntraMuscular once  insulin glargine Injectable (LANTUS) 12 Unit(s) SubCutaneous every morning  insulin lispro (HumaLOG) corrective regimen sliding scale   SubCutaneous three times a day before meals  labetalol 200 milliGRAM(s) Oral two times a day  levothyroxine 75 MICROGram(s) Oral daily  losartan 100 milliGRAM(s) Oral daily      LABS: All Labs Reviewed:                        14.8   7.58  )-----------( 136      ( 11 Feb 2019 17:38 )             43.8     02-11    136  |  99  |  19  ----------------------------<  312<H>  3.8   |  28  |  1.23    Ca    9.5      11 Feb 2019 17:38    TPro  8.4<H>  /  Alb  3.7  /  TBili  0.7  /  DBili  x   /  AST  33  /  ALT  34  /  AlkPhos  134<H>  02-11    PT/INR - ( 11 Feb 2019 17:38 )   PT: 10.3 sec;   INR: 0.93 ratio         PTT - ( 11 Feb 2019 17:38 )  PTT:30.4 sec  CARDIAC MARKERS ( 11 Feb 2019 20:12 )  <0.015 ng/mL / x     / x     / x     / x      CARDIAC MARKERS ( 11 Feb 2019 17:38 )  <0.015 ng/mL / x     / x     / x     / x          Blood Culture:

## 2019-02-13 NOTE — SWALLOW BEDSIDE ASSESSMENT ADULT - SWALLOW EVAL: CRITERIA FOR SKILLED INTERVENTION MET
ACUTE SPEECH PATHOLOGY INTERVENTION WOULD NOT CHANGE CLINICAL MANAGEMENT/OUTCOME. PT'S SPEECH-LANGUAGE AND OROPHARYNGEAL SWALLOWING ABILITIES ARE WITHIN FUNCTIONAL PARAMETERS. GIVEN ABOVE, WILL NOT ACTIVELY FOLLOW. RECONSULT PRN.

## 2019-02-13 NOTE — CONSULT NOTE ADULT - SUBJECTIVE AND OBJECTIVE BOX
64 yo F with PMHx DM2, HTN, CVA 15yrs ago, hypothyroidism p/w recurrent speech difficulty and Left index finger numbness , HA over the last  3 days. Pt had episode of trouble speaking 2 days ago lasting 20-25mins, and then again yesterday around 3pm with associated Left index finger numbness lasting 20 minutes.   Pt not compliant with her medications for the past 6 months as she went to [place] to bury her father; she was previously on Aspirin and Coumadin for prior CVA.  Pt found to have frontal lobe infarct on MRI.     ALL: NKDA  Home Rx: currently none  SHx: WADE  Social Hx: negative x 3  FMHx: Brother had CVA at age 51, Mother had HTN, DM2    Current Rx: Norvasc 10mg, BASA, Lipitor 40, Plavix 75, Hep SC, ISS, Labetalol 200 BID, Synthroid 75mcg, Losartan 100, Propranolol 10 BID     VS  Gen  Neck  CVD  Resp  Ext    Labs:             14.8   7.58  )-----------( 136      ( 11 Feb 2019 17:38 )             43.8     02-11    136  |  99  |  19  ----------------------------<  312<H>  3.8   |  28  |  1.23    Ca    9.5      11 Feb 2019 17:38    TPro  8.4<H>  /  Alb  3.7  /  TBili  0.7  /  DBili  x   /  AST  33  /  ALT  34  /  AlkPhos  134<H>  02-11    LIVER FUNCTIONS - ( 11 Feb 2019 17:38 )  Alb: 3.7 g/dL / Pro: 8.4 gm/dL / ALK PHOS: 134 U/L / ALT: 34 U/L / AST: 33 U/L / GGT: x           PT/INR - ( 11 Feb 2019 17:38 )   PT: 10.3 sec;   INR: 0.93 ratio       PTT - ( 11 Feb 2019 17:38 )  PTT:30.4 sec  CARDIAC MARKERS ( 11 Feb 2019 20:12 )  <0.015 ng/mL / x     / x     / x     / x      CARDIAC MARKERS ( 11 Feb 2019 17:38 )  <0.015 ng/mL / x     / x     / x     / x    HgbA1C 15.4  Chol 246/229/40/160    Imaging:  EKG: sinus tachycardia @ 107bpm, RBBB, LAD, LVH  Telemetry: NSR     CT Head: old right PCA territory infarct along with mild Emerald vessel ischemic  changes throughout the white matter of both hemispheres. No acute  abnormality suggested. Lacunar infarcts also noted in the brainstem.  MRI:  Acute tiny posterior left frontal lobe infarct  MRA: negative

## 2019-02-13 NOTE — SWALLOW BEDSIDE ASSESSMENT ADULT - SWALLOW EVAL: DIAGNOSIS
1) The patient exhibits functional Oropharyngeal Swallowing abilities for age without overt behavioral aspiration signs. Swallow status appears to be stable.  2) The pt was alert and interactive. She reported that she experienced 2 separate episodes where she was unable to pronounce words or write that precipitated this admission but now resolved. At the present time, she is oriented x3+ and able to verbalize during communicative probes as well as in conversation without evidence of a primary motor speech or primary linguistic pathology. The pt is able to effectively verbalize her needs and feels she is at communicative baseline.

## 2019-02-13 NOTE — SWALLOW BEDSIDE ASSESSMENT ADULT - ADDITIONAL RECOMMENDATIONS
1) NEURO CONSULT TO R/O TIA.    2) NUTRITION FOLLOW UP AS PATIENT WITH A HISTORY OF DM AS WELL AS HTN.

## 2019-02-14 ENCOUNTER — TRANSCRIPTION ENCOUNTER (OUTPATIENT)
Age: 64
End: 2019-02-14

## 2019-02-14 VITALS
OXYGEN SATURATION: 100 % | DIASTOLIC BLOOD PRESSURE: 78 MMHG | SYSTOLIC BLOOD PRESSURE: 137 MMHG | HEART RATE: 78 BPM | RESPIRATION RATE: 18 BRPM | TEMPERATURE: 98 F

## 2019-02-14 LAB
GLUCOSE BLDC GLUCOMTR-MCNC: 193 MG/DL — HIGH (ref 70–99)
GLUCOSE BLDC GLUCOMTR-MCNC: 203 MG/DL — HIGH (ref 70–99)
GLUCOSE BLDC GLUCOMTR-MCNC: 208 MG/DL — HIGH (ref 70–99)

## 2019-02-14 PROCEDURE — 33285 INSJ SUBQ CAR RHYTHM MNTR: CPT

## 2019-02-14 RX ORDER — ASPIRIN/CALCIUM CARB/MAGNESIUM 324 MG
1 TABLET ORAL
Qty: 0 | Refills: 0 | DISCHARGE
Start: 2019-02-14

## 2019-02-14 RX ORDER — ENOXAPARIN SODIUM 100 MG/ML
30 INJECTION SUBCUTANEOUS
Qty: 1 | Refills: 0
Start: 2019-02-14 | End: 2019-03-15

## 2019-02-14 RX ORDER — ATORVASTATIN CALCIUM 80 MG/1
1 TABLET, FILM COATED ORAL
Qty: 30 | Refills: 0
Start: 2019-02-14 | End: 2019-03-15

## 2019-02-14 RX ORDER — DAPAGLIFLOZIN 10 MG/1
1 TABLET, FILM COATED ORAL
Qty: 0 | Refills: 0 | COMMUNITY

## 2019-02-14 RX ORDER — METFORMIN HYDROCHLORIDE 850 MG/1
4 TABLET ORAL
Qty: 0 | Refills: 0 | COMMUNITY

## 2019-02-14 RX ORDER — ATORVASTATIN CALCIUM 80 MG/1
80 TABLET, FILM COATED ORAL AT BEDTIME
Qty: 0 | Refills: 0 | Status: DISCONTINUED | OUTPATIENT
Start: 2019-02-14 | End: 2019-02-14

## 2019-02-14 RX ORDER — INSULIN ASPART 100 [IU]/ML
10 INJECTION, SOLUTION SUBCUTANEOUS
Qty: 1 | Refills: 0
Start: 2019-02-14 | End: 2019-03-15

## 2019-02-14 RX ORDER — SIMVASTATIN 20 MG/1
1 TABLET, FILM COATED ORAL
Qty: 0 | Refills: 0 | COMMUNITY

## 2019-02-14 RX ORDER — PROPRANOLOL HCL 160 MG
1 CAPSULE, EXTENDED RELEASE 24HR ORAL
Qty: 0 | Refills: 0 | COMMUNITY

## 2019-02-14 RX ORDER — FELODIPINE 5 MG/1
1 TABLET, FILM COATED, EXTENDED RELEASE ORAL
Qty: 0 | Refills: 0 | COMMUNITY

## 2019-02-14 RX ADMIN — INSULIN GLARGINE 20 UNIT(S): 100 INJECTION, SOLUTION SUBCUTANEOUS at 10:31

## 2019-02-14 RX ADMIN — Medication 75 MICROGRAM(S): at 07:20

## 2019-02-14 RX ADMIN — Medication 7 UNIT(S): at 10:34

## 2019-02-14 RX ADMIN — Medication 81 MILLIGRAM(S): at 13:12

## 2019-02-14 RX ADMIN — Medication 2: at 10:35

## 2019-02-14 RX ADMIN — HEPARIN SODIUM 5000 UNIT(S): 5000 INJECTION INTRAVENOUS; SUBCUTANEOUS at 13:12

## 2019-02-14 RX ADMIN — CLOPIDOGREL BISULFATE 75 MILLIGRAM(S): 75 TABLET, FILM COATED ORAL at 13:11

## 2019-02-14 RX ADMIN — Medication 2: at 07:20

## 2019-02-14 NOTE — DISCHARGE NOTE ADULT - MEDICATION SUMMARY - MEDICATIONS TO TAKE
I will START or STAY ON the medications listed below when I get home from the hospital:    aspirin 81 mg oral delayed release tablet  -- 1 tab(s) by mouth once a day  -- Indication: For .    Lantus Solostar Pen 100 units/mL subcutaneous solution  -- 30  subcutaneous once a day   -- Do not drink alcoholic beverages when taking this medication.  It is very important that you take or use this exactly as directed.  Do not skip doses or discontinue unless directed by your doctor.  Keep in refrigerator.  Do not freeze.    -- Indication: For .    NovoLOG FlexPen 100 units/mL injectable solution  -- 10 unit(s) injectable 3 times a day (before meals)   -- Check with your doctor before becoming pregnant.  Do not drink alcoholic beverages when taking this medication.  Keep in refrigerator.  Do not freeze.  Obtain medical advice before taking any non-prescription drugs as some may affect the action of this medication.    -- Indication: For .    atorvastatin 80 mg oral tablet  -- 1 tab(s) by mouth once a day (at bedtime)  -- Indication: For .    losartan-hydroCHLOROthiazide 100 mg-25 mg oral tablet  -- 1 tab(s) by mouth once a day  -- Indication: For .    clopidogrel 75 mg oral tablet  -- 1 tab(s) by mouth once a day  -- Indication: For .    labetalol 200 mg oral tablet  -- 1 tab(s) by mouth 2 times a day  -- Indication: For .    amLODIPine 10 mg oral tablet  -- 1 tab(s) by mouth once a day  -- Indication: For .    Synthroid 75 mcg (0.075 mg) oral tablet  -- 1 tab(s) by mouth once a day  -- Indication: For .

## 2019-02-14 NOTE — DISCHARGE NOTE ADULT - MEDICATION SUMMARY - MEDICATIONS TO STOP TAKING
I will STOP taking the medications listed below when I get home from the hospital:    felodipine 10 mg oral tablet, extended release  -- 1 tab(s) by mouth once a day    Farxiga 5 mg oral tablet  -- 1 tab(s) by mouth once a day    glipiZIDE 10 mg oral tablet  -- 1 tab(s) by mouth once a day    propranolol 10 mg oral tablet  -- 1 tab(s) by mouth 2 times a day    metFORMIN 500 mg oral tablet, extended release  -- 4 tab(s) by mouth once a day    glipiZIDE 10 mg oral tablet  -- 1 tab(s) by mouth 2 times a day

## 2019-02-14 NOTE — DISCHARGE NOTE ADULT - PLAN OF CARE
to prevent futuer stroke Take all medications as directed.   Follow up with Cardiology as directed.   Follow up with Neurology in 4 weeks.

## 2019-02-14 NOTE — DISCHARGE NOTE ADULT - PATIENT PORTAL LINK FT
You can access the [email or web address] Patient Portal, offered by [facility], by registering with the following website: [email or web address]

## 2019-02-14 NOTE — DISCHARGE NOTE ADULT - HOSPITAL COURSE
64 y/o woman with a PMH of DM, hypothyroidism, HTN, CVA (21 years ago, taken off ASA and coumadin 15 years ago) who presents to the ED c/o recurrent speech difficulty and Left index finger numbness , HA over the last  3 days. Pt had stopped taking all her meds when she went to [place] 6 months ago to bury her father.  Pt took Tylenol yesterday with relief of HA, no pain meds today.  Pt reports trouble speaking 2 days ago at 10am lasting 20-25 min, states she could not formulate a sentence.   This again occurred today with Left index numbess at 3pm , lasting 20 min Pt denies CP, palpations or any other symptoms.     In ED, pulse 113, /100. Medicine was asked for evaluation. Pt was admitted to telemetry for CVA evaluation. Neurology was involved. ANGELITO 2/14 with LINQ placement. Medically stable for discharge with outpatient follow up.        *CVA DDX TIA Aphasia and headache, NIHSS 0  - CT head: old right PCA territory infarct with mild ischemic changes both hemispheres. Lacunar infarcts brainstem.  - MRI/MRA: very small left posterior frontal infarct, intracranial atherosclerosis  - Risk factor evaluation telemetry/LINQ, A1C 15, lipids 160, ANGELITO  - s/p ASA 325mg in ED. plavix/statin  -ANGELITO and LINQ 2/14  -PT evaluation recs no needs  -dysphagia screen: passed  - Neurology consult  - counseled re: compliance with meds, and risk of CVA and morbidity    *DM type II with hyperglycemia and vascular complication  - A1C 15  -pt agreeable to 4 shots per day. lantus 30, lispro 10 TID  -pharmacy education on injection    *Hematuria  -follow as outpatient    *Hypothyroidism with medication noncompliance    *DVT prophylaxis  - heparin      total time, including coordination of care: 38 minutes, including coordination with case management and patient education  GEN: NAD  EYES: eomi  CV: no edema  RESP: unlabored

## 2019-02-14 NOTE — PROCEDURAL SAFETY CHECKLIST WITH OR WITHOUT SEDATION - NSPRESEDATIONFT_GEN_ALL_CORE
Physician confirms case reviewed for anesthesia consultation requirements.

## 2019-02-14 NOTE — PROCEDURAL SAFETY CHECKLIST WITH OR WITHOUT SEDATION - NSPOSTCOMMENTFT_GEN_ALL_CORE
Procedure started at 0918 with Dr. Washington and Kiara Maguire, implantable loop recoder to left chest wall finished at 0925, tolerated procedure well. Procedure started at 0918 with Dr. Washington and Kiara Maguire, implantable loop recorder to left chest wall finished at 0925, tolerated procedure well.

## 2019-02-14 NOTE — PROGRESS NOTE ADULT - SUBJECTIVE AND OBJECTIVE BOX
Cardiology Progress Note    HPI: Pt is a 64 y/o woman with a PMHx of DM, hypothyroidism, HTN, CVA (21 years ago, taken off ASA and coumadin 15 years ago) who presents to the ED c/o recurrent speech difficulty and Left index finger numbness , HA over the last  3 days. Pt had stopped taking all her meds when she went to [place] 6 months ago to bury her father.  Pt took Tylenol yesterday with relief of HA, no pain meds today.  Pt reports trouble speaking 2 days ago at 10am lasting 20-25 min, states she could not formulate a sentence.   This again occurred today with Left index numbess at 3pm , lasting 20 min Pt denies CP, palpations or any other symptoms.     . Case d/w pt and family at bedside. No CP/SOB.   Aphasia, confusion improved back to baseline. CTH negative for acute findings.   Tele- SR.    . Frontal lobe infarct seen on MRI. No CP/SOB. No events last pm.   SR on tele.     . S/p ANGELITO this AM- No CSOE, Nl LV fxn, mild MR, mild TR.  Now awaiting LINQ placment. No CP/SOB.     Surg Hx:  WADE    FamHx:  Mother DM, HTN, Asthma,  in 60s  Father  at 74 of old age  Brother CVA at 51  Sister DM 51 (2019 21:09)    PAST MEDICAL & SURGICAL HISTORY:  CVA (cerebral vascular accident)  HTN (hypertension)  DM (diabetes mellitus)    Allergies  No Known Allergies    SOCIAL HISTORY: Denies tobacco, etoh abuse or illicit drug use    FAMILY HISTORY: Noncontributory    MEDICATIONS  (STANDING):  amLODIPine   Tablet 10 milliGRAM(s) Oral daily  aspirin enteric coated 81 milliGRAM(s) Oral daily  atorvastatin 40 milliGRAM(s) Oral at bedtime  clopidogrel Tablet 75 milliGRAM(s) Oral daily  heparin  Injectable 5000 Unit(s) SubCutaneous every 8 hours  influenza   Vaccine 0.5 milliLiter(s) IntraMuscular once  insulin lispro (HumaLOG) corrective regimen sliding scale   SubCutaneous three times a day before meals  labetalol 200 milliGRAM(s) Oral two times a day  levothyroxine 75 MICROGram(s) Oral daily  losartan 100 milliGRAM(s) Oral daily  propranolol 10 milliGRAM(s) Oral two times a day    MEDICATIONS  (PRN):    Vital Signs Last 24 Hrs  T(C): 36.2 (2019 05:57), Max: 37.6 (2019 16:39)  T(F): 97.2 (2019 05:57), Max: 99.6 (2019 16:39)  HR: 88 (2019 05:57) (88 - 113)  BP: 145/80 (2019 05:57) (120/94 - 156/100)  BP(mean): --  RR: 18 (2019 05:57) (16 - 21)  SpO2: 95% (2019 05:57) (95% - 99%)    REVIEW OF SYSTEMS:    CONSTITUTIONAL:  As per HPI.  HEENT:  Eyes:  No diplopia or blurred vision. ENT:  No earache, sore throat or runny nose.  CARDIOVASCULAR:  No pressure, squeezing, strangling, tightness, heaviness or aching about the chest, neck, axilla or epigastrium.  RESPIRATORY:  No cough, shortness of breath, PND or orthopnea.  GASTROINTESTINAL:  No nausea, vomiting or diarrhea.  GENITOURINARY:  No dysuria, frequency or urgency.  MUSCULOSKELETAL:  As per HPI.  SKIN:  No change in skin, hair or nails.  NEUROLOGIC:  No paresthesias, fasciculations, seizures or weakness.    PHYSICAL EXAMINATION:    GENERAL APPEARANCE:  Pt. is not currently dyspneic, in no distress. Pt. is alert, oriented, and pleasant.  HEENT:  Pupils are normal and react normally. No icterus. Mucous membranes well colored.  NECK:  Supple. No lymphadenopathy. Jugular venous pressure not elevated. Carotids equal.   HEART:   The cardiac impulse has a normal quality. There are no murmurs, rubs or gallops noted  CHEST:  Chest is clear to auscultation. Normal respiratory effort.  ABDOMEN:  Soft and nontender.   EXTREMITIES:  There is no edema.     I&O's Summary      LABS:                        14.8   7.58  )-----------( 136      ( 2019 17:38 )             43.8         136  |  99  |  19  ----------------------------<  312<H>  3.8   |  28  |  1.23    Ca    9.5      2019 17:38    TPro  8.4<H>  /  Alb  3.7  /  TBili  0.7  /  DBili  x   /  AST  33  /  ALT  34  /  AlkPhos  134<H>  02-11    LIVER FUNCTIONS - ( 2019 17:38 )  Alb: 3.7 g/dL / Pro: 8.4 gm/dL / ALK PHOS: 134 U/L / ALT: 34 U/L / AST: 33 U/L / GGT: x           PT/INR - ( 2019 17:38 )   PT: 10.3 sec;   INR: 0.93 ratio       PTT - ( 2019 17:38 )  PTT:30.4 sec  CARDIAC MARKERS ( 2019 20:12 )  <0.015 ng/mL / x     / x     / x     / x      CARDIAC MARKERS ( 2019 17:38 )  <0.015 ng/mL / x     / x     / x     / x        Urinalysis Basic - ( 2019 17:38 )    Color: Yellow / Appearance: Clear / S.010 / pH: x  Gluc: x / Ketone: Small  / Bili: Negative / Urobili: Negative   Blood: x / Protein: Negative / Nitrite: Negative   Leuk Esterase: Negative / RBC: 3-5 /HPF / WBC 0-2   Sq Epi: x / Non Sq Epi: Negative / Bacteria: Negative     EKG: Stach @ 107. RBBB. LVH. Nonspecific ST changes.     TELEMETRY: SR    CARDIAC TESTS: pending    RADIOLOGY & ADDITIONAL STUDIES: < from: CT Head No Cont (19 @ 18:22) >  1)  old right PCA territory infarct along with mild Emerald vessel ischemic   changes throughout the white matter of both hemispheres. No acute   abnormality suggested. Lacunar infarcts also noted in the brainstem.  2)  clear sinuses and mastoids..  < from: MR Head No Cont (19 @ 18:17) >  IMPRESSION: Acute tiny posterior left frontal lobe infarct.  MR neck- negative.     ASSESSMENT & PLAN:

## 2019-02-14 NOTE — PROGRESS NOTE ADULT - SUBJECTIVE AND OBJECTIVE BOX
HPI: ** PATIENT NOT YET SEEN**    ROS: 12 pt ROS negative other than discussed above     MEDICATIONS  (STANDING):  amLODIPine   Tablet 10 milliGRAM(s) Oral daily  aspirin enteric coated 81 milliGRAM(s) Oral daily  atorvastatin 40 milliGRAM(s) Oral at bedtime  clopidogrel Tablet 75 milliGRAM(s) Oral daily  heparin  Injectable 5000 Unit(s) SubCutaneous every 8 hours  influenza   Vaccine 0.5 milliLiter(s) IntraMuscular once  insulin glargine Injectable (LANTUS) 20 Unit(s) SubCutaneous every morning  insulin lispro (HumaLOG) corrective regimen sliding scale   SubCutaneous three times a day before meals  insulin lispro Injectable (HumaLOG) 7 Unit(s) SubCutaneous three times a day before meals  labetalol 200 milliGRAM(s) Oral two times a day  levothyroxine 75 MICROGram(s) Oral daily  losartan 100 milliGRAM(s) Oral daily      Vital Signs Last 24 Hrs  T(C): 36.7 (14 Feb 2019 05:03), Max: 36.8 (13 Feb 2019 17:24)  T(F): 98.1 (14 Feb 2019 05:03), Max: 98.3 (13 Feb 2019 17:24)  HR: 81 (14 Feb 2019 05:03) (81 - 86)  BP: 142/80 (14 Feb 2019 05:03) (124/66 - 142/80)  BP(mean): --  RR: 16 (14 Feb 2019 05:03) (16 - 17)  SpO2: 100% (14 Feb 2019 05:03) (97% - 100%)      EXAM:  Constitutional: awake and alert.  HEENT: PERRLA, EOMI,   Neck: Supple.  Cardiovascular: S1 and S2, regular rhythm  Extremities:  no edema    Neurological exam:  HF: Alert oriented, fluent speech intact attention     CN: VAL, EOMI, LEFT hemifield cut (OLD); facial sensation normal, no NLFD, tongue midline, Palate moves equally, SCM equal bilaterally    Motor: No pronator drift, Strength 5/5 in all 4 ext, normal bulk and tone, no tremor    Sens: Intact to light touch, vibration     Reflexes: 1+ in arms absent in both legs (achilles, and patellar)    Coord: FNF normal and symmetric       Labs  02-12 [id number] 15.4    02-12 Chol 246<H> <H> HDL 40<L> Trig 229<H>      Radiology report:  - CT Head 2/11/19: old right PCA territory infarct along with mild Emerald vessel ischemic changes throughout the white matter of both hemispheres. No acute abnormality suggested. Lacunar infarcts also noted in the brainstem    - MRI brain 2/12/19: punctate left posterior frontal stroke     - MRA head/neck 2/12/19: Moderate multifocal areas of stenosis right PCA P3 segment. Moderate stenosis in the distal vertebral artery just proximal to the vertebrobasilar junction. Mild stenosis in the left MCA bifurcation. The [place] and vertebrobasilar system shows no other evidence of significant stenosis, occlusion or saccular aneurysm dilation. No evidence for arterial venous malformation. The vertebral arteries are codominant. No significant stenosis in the neck     - TTE pending     A/P:   63 year old woman with history of stroke (R PCA), poorly controlled DM (a1c 15.4), HLD (), HTN presenting with two episodes of aphasia, found to have only very small left posterior frontal infarct. Patient has intracranial atherosclerosis which may be leading to her strokes however atrial fibrillation is not yet ruled out.     - PLEASE INCREASE Atorvastatin to 80mg (inc this admission from 40mg) - goal LDL is under 70  - Diabetes control   - BP to normotensive   - Cont Aspirin 81mg & Plavix 75mg  - Awaiting TTE and LINQ  - she has PCP who she will follow with  - when ready for discharge she can see me in clinic for neurology follow up

## 2019-02-14 NOTE — DISCHARGE NOTE ADULT - ADDITIONAL INSTRUCTIONS
PCP- follow up in 1 week. Bring these papers with you to that appointment so your PCP can request records from your hospital stay. Bring your blood glucose log to this visit so your insulin log can be adjusted. You will also need a UA repeated at this visit.

## 2019-02-14 NOTE — PHARMACOTHERAPY INTERVENTION NOTE - COMMENTS
Provided diabetes education - counseled patient on use of insulin pens; provided written education as well

## 2019-02-14 NOTE — PACU DISCHARGE NOTE - COMMENTS
Report given to nurse Shamir, verified pt on monitor with tele tech Gabi, pt transferred in stable condition on monitor accompany by staff and daughter.  Pt's daughter has remote monitor on hand.

## 2019-02-14 NOTE — PROGRESS NOTE ADULT - ASSESSMENT
A/P: 63F PMHx of DM, hypothyroidism, HTN, CVA (21 years ago, taken off ASA and coumadin 15 years ago) who presents to the ED c/o recurrent speech difficulty and Left index finger numbness.    1. Aphasia. Possible TIA/CVA. CTH shows old infarct but no acute findings.   MR brain with small, acute frontal lobe infarct.   Cont asa/statin/plavix. Neuro following.  S/p ANGELITO- no CSOE identified.    Now awaiting LINQ placement today.    Cont monitoring on tele.   Tight BP control. Would benefit from outpt records.     2. HTN. Pt on multiple antihypertensives- cont current regimen as BP appears stable.     3. Dyslipidemia. Cont statin.     4. Trops negative, no active CP. Can have outpt ischemic eval upon discharge.     5. DVT proph, PT eval. Outpt f/u with me next week.

## 2019-02-14 NOTE — PROCEDURAL SAFETY CHECKLIST WITH OR WITHOUT SEDATION - NSPRESEDATION2FT_GEN_ALL_CORE
Anesthesia confirms case reviewed for anesthesia risk alert.

## 2019-02-14 NOTE — DISCHARGE NOTE ADULT - CARE PLAN
Principal Discharge DX:	CVA (cerebral vascular accident)  Goal:	to prevent futuer stroke  Assessment and plan of treatment:	Take all medications as directed.   Follow up with Cardiology as directed.   Follow up with Neurology in 4 weeks.

## 2019-02-17 ENCOUNTER — EMERGENCY (EMERGENCY)
Facility: HOSPITAL | Age: 64
LOS: 0 days | Discharge: HOME | End: 2019-02-17
Attending: EMERGENCY MEDICINE | Admitting: EMERGENCY MEDICINE
Payer: COMMERCIAL

## 2019-02-17 VITALS — HEIGHT: 68 IN | WEIGHT: 169.98 LBS

## 2019-02-17 VITALS
SYSTOLIC BLOOD PRESSURE: 139 MMHG | RESPIRATION RATE: 18 BRPM | HEART RATE: 83 BPM | TEMPERATURE: 98 F | OXYGEN SATURATION: 98 % | DIASTOLIC BLOOD PRESSURE: 80 MMHG

## 2019-02-17 DIAGNOSIS — I10 ESSENTIAL (PRIMARY) HYPERTENSION: ICD-10-CM

## 2019-02-17 DIAGNOSIS — Z79.4 LONG TERM (CURRENT) USE OF INSULIN: ICD-10-CM

## 2019-02-17 DIAGNOSIS — E11.9 TYPE 2 DIABETES MELLITUS WITHOUT COMPLICATIONS: ICD-10-CM

## 2019-02-17 DIAGNOSIS — E03.9 HYPOTHYROIDISM, UNSPECIFIED: ICD-10-CM

## 2019-02-17 DIAGNOSIS — Z86.73 PERSONAL HISTORY OF TRANSIENT ISCHEMIC ATTACK (TIA), AND CEREBRAL INFARCTION WITHOUT RESIDUAL DEFICITS: ICD-10-CM

## 2019-02-17 PROBLEM — I63.9 CEREBRAL INFARCTION, UNSPECIFIED: Chronic | Status: ACTIVE | Noted: 2019-02-11

## 2019-02-17 LAB
ALBUMIN SERPL ELPH-MCNC: 3.3 G/DL — SIGNIFICANT CHANGE UP (ref 3.3–5)
ALP SERPL-CCNC: 127 U/L — HIGH (ref 40–120)
ALT FLD-CCNC: 29 U/L — SIGNIFICANT CHANGE UP (ref 12–78)
ANION GAP SERPL CALC-SCNC: 8 MMOL/L — SIGNIFICANT CHANGE UP (ref 5–17)
AST SERPL-CCNC: 26 U/L — SIGNIFICANT CHANGE UP (ref 15–37)
BASOPHILS # BLD AUTO: 0.03 K/UL — SIGNIFICANT CHANGE UP (ref 0–0.2)
BASOPHILS NFR BLD AUTO: 0.8 % — SIGNIFICANT CHANGE UP (ref 0–2)
BILIRUB SERPL-MCNC: 0.6 MG/DL — SIGNIFICANT CHANGE UP (ref 0.2–1.2)
BUN SERPL-MCNC: 15 MG/DL — SIGNIFICANT CHANGE UP (ref 7–23)
CALCIUM SERPL-MCNC: 9.5 MG/DL — SIGNIFICANT CHANGE UP (ref 8.5–10.1)
CHLORIDE SERPL-SCNC: 106 MMOL/L — SIGNIFICANT CHANGE UP (ref 96–108)
CO2 SERPL-SCNC: 27 MMOL/L — SIGNIFICANT CHANGE UP (ref 22–31)
CREAT SERPL-MCNC: 0.9 MG/DL — SIGNIFICANT CHANGE UP (ref 0.5–1.3)
EOSINOPHIL # BLD AUTO: 0.14 K/UL — SIGNIFICANT CHANGE UP (ref 0–0.5)
EOSINOPHIL NFR BLD AUTO: 3.9 % — SIGNIFICANT CHANGE UP (ref 0–6)
GLUCOSE SERPL-MCNC: 142 MG/DL — HIGH (ref 70–99)
HCT VFR BLD CALC: 40.9 % — SIGNIFICANT CHANGE UP (ref 34.5–45)
HGB BLD-MCNC: 13.5 G/DL — SIGNIFICANT CHANGE UP (ref 11.5–15.5)
IMM GRANULOCYTES NFR BLD AUTO: 1.1 % — SIGNIFICANT CHANGE UP (ref 0–1.5)
LYMPHOCYTES # BLD AUTO: 0.83 K/UL — LOW (ref 1–3.3)
LYMPHOCYTES # BLD AUTO: 23.2 % — SIGNIFICANT CHANGE UP (ref 13–44)
MCHC RBC-ENTMCNC: 29 PG — SIGNIFICANT CHANGE UP (ref 27–34)
MCHC RBC-ENTMCNC: 33 GM/DL — SIGNIFICANT CHANGE UP (ref 32–36)
MCV RBC AUTO: 87.8 FL — SIGNIFICANT CHANGE UP (ref 80–100)
MONOCYTES # BLD AUTO: 0.24 K/UL — SIGNIFICANT CHANGE UP (ref 0–0.9)
MONOCYTES NFR BLD AUTO: 6.7 % — SIGNIFICANT CHANGE UP (ref 2–14)
NEUTROPHILS # BLD AUTO: 2.3 K/UL — SIGNIFICANT CHANGE UP (ref 1.8–7.4)
NEUTROPHILS NFR BLD AUTO: 64.3 % — SIGNIFICANT CHANGE UP (ref 43–77)
NRBC # BLD: 0 /100 WBCS — SIGNIFICANT CHANGE UP (ref 0–0)
PLATELET # BLD AUTO: 122 K/UL — LOW (ref 150–400)
POTASSIUM SERPL-MCNC: 3.4 MMOL/L — LOW (ref 3.5–5.3)
POTASSIUM SERPL-SCNC: 3.4 MMOL/L — LOW (ref 3.5–5.3)
PROT SERPL-MCNC: 8 GM/DL — SIGNIFICANT CHANGE UP (ref 6–8.3)
RBC # BLD: 4.66 M/UL — SIGNIFICANT CHANGE UP (ref 3.8–5.2)
RBC # FLD: 12.7 % — SIGNIFICANT CHANGE UP (ref 10.3–14.5)
SODIUM SERPL-SCNC: 141 MMOL/L — SIGNIFICANT CHANGE UP (ref 135–145)
WBC # BLD: 3.58 K/UL — LOW (ref 3.8–10.5)
WBC # FLD AUTO: 3.58 K/UL — LOW (ref 3.8–10.5)

## 2019-02-17 PROCEDURE — 99284 EMERGENCY DEPT VISIT MOD MDM: CPT | Mod: 25

## 2019-02-17 PROCEDURE — 93010 ELECTROCARDIOGRAM REPORT: CPT

## 2019-02-17 PROCEDURE — 71045 X-RAY EXAM CHEST 1 VIEW: CPT | Mod: 26

## 2019-02-17 NOTE — ED PROVIDER NOTE - PMH
CVA (cerebral vascular accident)    CVA (cerebral vascular accident)  with residual loss of peripheral vision in Rt eye  DM (diabetes mellitus)    DM (diabetes mellitus)    HTN (hypertension)    HTN (hypertension)

## 2019-02-17 NOTE — ED ADULT NURSE NOTE - CHIEF COMPLAINT QUOTE
pt was d/c from [facility] , c/o htn eval despite taking BP medication , 200/115 at home, has link recorder

## 2019-02-17 NOTE — ED PROVIDER NOTE - CLINICAL SUMMARY MEDICAL DECISION MAKING FREE TEXT BOX
Pt is asymptomatic.  EKG similar to prior, nonischemic.  Labs WNL.  CXR normal.  No meds given in ED, observed, and BP improved on own to 135/67.  Transient hypertension may be secondary to diet as had some increased salt.  Okay for d/c home.  F/u with PCP.

## 2019-02-17 NOTE — ED ADULT NURSE NOTE - OBJECTIVE STATEMENT
pt presents to ED c/o /115 at home. recently d/c'd from  on Thursday s/p CVA. reports hx CVA at age 40 and was instructed to come to ED whenever BP is elevated. denies skipping any doses of BP meds. denies headache/focal changes. denies all pain.

## 2019-02-17 NOTE — ED PROVIDER NOTE - OBJECTIVE STATEMENT
63F PMHx of DM, hypothyroidism, HTN, CVA (21 years ago, taken off ASA and coumadin 15 years ago) who presents to the ED c/o HTN despite taking BP meds (labetalol, amlodipine) taken about 15 minutes PTA. Pt states she was eating some foods high in carbohydrates today. BP was 200/115 at home today. In ED BP is 176/91. Pt reports her BP was fine yesterday- pt's BP usually ranges in the 140's. No other acute complaints at this time. Denies CP, SOB. Pt with recent D/C from  on 2/14/19 after admission for aphasia and difficulty writing.

## 2019-02-17 NOTE — ED ADULT TRIAGE NOTE - CHIEF COMPLAINT QUOTE
pt was d/c from [facility] , c/o htn eval despite taking BP medication , 200/115 at home, has link recorder

## 2019-02-19 DIAGNOSIS — R31.9 HEMATURIA, UNSPECIFIED: ICD-10-CM

## 2019-02-19 DIAGNOSIS — R47.01 APHASIA: ICD-10-CM

## 2019-02-19 DIAGNOSIS — I10 ESSENTIAL (PRIMARY) HYPERTENSION: ICD-10-CM

## 2019-02-19 DIAGNOSIS — E11.65 TYPE 2 DIABETES MELLITUS WITH HYPERGLYCEMIA: ICD-10-CM

## 2019-02-19 DIAGNOSIS — Z86.73 PERSONAL HISTORY OF TRANSIENT ISCHEMIC ATTACK (TIA), AND CEREBRAL INFARCTION WITHOUT RESIDUAL DEFICITS: ICD-10-CM

## 2019-02-19 DIAGNOSIS — I63.9 CEREBRAL INFARCTION, UNSPECIFIED: ICD-10-CM

## 2019-02-19 DIAGNOSIS — E03.9 HYPOTHYROIDISM, UNSPECIFIED: ICD-10-CM

## 2019-02-19 DIAGNOSIS — Z91.14 PATIENT'S OTHER NONCOMPLIANCE WITH MEDICATION REGIMEN: ICD-10-CM

## 2019-02-28 ENCOUNTER — APPOINTMENT (OUTPATIENT)
Dept: ELECTROPHYSIOLOGY | Facility: CLINIC | Age: 64
End: 2019-02-28

## 2019-08-18 ENCOUNTER — INPATIENT (INPATIENT)
Facility: HOSPITAL | Age: 64
LOS: 3 days | Discharge: HOME HEALTH | DRG: 61 | End: 2019-08-22
Attending: EMERGENCY MEDICINE | Admitting: INTERNAL MEDICINE
Payer: COMMERCIAL

## 2019-08-18 VITALS — WEIGHT: 184.97 LBS

## 2019-08-18 DIAGNOSIS — I63.412 CEREBRAL INFARCTION DUE TO EMBOLISM OF LEFT MIDDLE CEREBRAL ARTERY: ICD-10-CM

## 2019-08-18 DIAGNOSIS — I10 ESSENTIAL (PRIMARY) HYPERTENSION: ICD-10-CM

## 2019-08-18 DIAGNOSIS — I63.9 CEREBRAL INFARCTION, UNSPECIFIED: ICD-10-CM

## 2019-08-18 DIAGNOSIS — E11.59 TYPE 2 DIABETES MELLITUS WITH OTHER CIRCULATORY COMPLICATIONS: ICD-10-CM

## 2019-08-18 LAB
ALBUMIN SERPL ELPH-MCNC: 3.9 G/DL — SIGNIFICANT CHANGE UP (ref 3.3–5)
ALP SERPL-CCNC: 122 U/L — HIGH (ref 40–120)
ALT FLD-CCNC: 49 U/L — SIGNIFICANT CHANGE UP (ref 12–78)
ANION GAP SERPL CALC-SCNC: 6 MMOL/L — SIGNIFICANT CHANGE UP (ref 5–17)
AST SERPL-CCNC: 34 U/L — SIGNIFICANT CHANGE UP (ref 15–37)
BASOPHILS # BLD AUTO: 0.03 K/UL — SIGNIFICANT CHANGE UP (ref 0–0.2)
BASOPHILS NFR BLD AUTO: 0.8 % — SIGNIFICANT CHANGE UP (ref 0–2)
BILIRUB SERPL-MCNC: 0.5 MG/DL — SIGNIFICANT CHANGE UP (ref 0.2–1.2)
BUN SERPL-MCNC: 17 MG/DL — SIGNIFICANT CHANGE UP (ref 7–23)
CALCIUM SERPL-MCNC: 9.8 MG/DL — SIGNIFICANT CHANGE UP (ref 8.5–10.1)
CHLORIDE SERPL-SCNC: 105 MMOL/L — SIGNIFICANT CHANGE UP (ref 96–108)
CK SERPL-CCNC: 232 U/L — HIGH (ref 26–192)
CO2 SERPL-SCNC: 27 MMOL/L — SIGNIFICANT CHANGE UP (ref 22–31)
CREAT SERPL-MCNC: 1.07 MG/DL — SIGNIFICANT CHANGE UP (ref 0.5–1.3)
EOSINOPHIL # BLD AUTO: 0.25 K/UL — SIGNIFICANT CHANGE UP (ref 0–0.5)
EOSINOPHIL NFR BLD AUTO: 6.8 % — HIGH (ref 0–6)
GLUCOSE SERPL-MCNC: 214 MG/DL — HIGH (ref 70–99)
HCT VFR BLD CALC: 37.4 % — SIGNIFICANT CHANGE UP (ref 34.5–45)
HGB BLD-MCNC: 12.6 G/DL — SIGNIFICANT CHANGE UP (ref 11.5–15.5)
IMM GRANULOCYTES NFR BLD AUTO: 0.8 % — SIGNIFICANT CHANGE UP (ref 0–1.5)
LYMPHOCYTES # BLD AUTO: 0.94 K/UL — LOW (ref 1–3.3)
LYMPHOCYTES # BLD AUTO: 25.6 % — SIGNIFICANT CHANGE UP (ref 13–44)
MCHC RBC-ENTMCNC: 29.6 PG — SIGNIFICANT CHANGE UP (ref 27–34)
MCHC RBC-ENTMCNC: 33.7 GM/DL — SIGNIFICANT CHANGE UP (ref 32–36)
MCV RBC AUTO: 88 FL — SIGNIFICANT CHANGE UP (ref 80–100)
MONOCYTES # BLD AUTO: 0.26 K/UL — SIGNIFICANT CHANGE UP (ref 0–0.9)
MONOCYTES NFR BLD AUTO: 7.1 % — SIGNIFICANT CHANGE UP (ref 2–14)
NEUTROPHILS # BLD AUTO: 2.16 K/UL — SIGNIFICANT CHANGE UP (ref 1.8–7.4)
NEUTROPHILS NFR BLD AUTO: 58.9 % — SIGNIFICANT CHANGE UP (ref 43–77)
PLATELET # BLD AUTO: 154 K/UL — SIGNIFICANT CHANGE UP (ref 150–400)
POTASSIUM SERPL-MCNC: 3.9 MMOL/L — SIGNIFICANT CHANGE UP (ref 3.5–5.3)
POTASSIUM SERPL-SCNC: 3.9 MMOL/L — SIGNIFICANT CHANGE UP (ref 3.5–5.3)
PROT SERPL-MCNC: 8.3 GM/DL — SIGNIFICANT CHANGE UP (ref 6–8.3)
RBC # BLD: 4.25 M/UL — SIGNIFICANT CHANGE UP (ref 3.8–5.2)
RBC # FLD: 12.7 % — SIGNIFICANT CHANGE UP (ref 10.3–14.5)
SODIUM SERPL-SCNC: 138 MMOL/L — SIGNIFICANT CHANGE UP (ref 135–145)
TROPONIN I SERPL-MCNC: <0.015 NG/ML — SIGNIFICANT CHANGE UP (ref 0.01–0.04)
WBC # BLD: 3.67 K/UL — LOW (ref 3.8–10.5)
WBC # FLD AUTO: 3.67 K/UL — LOW (ref 3.8–10.5)

## 2019-08-18 PROCEDURE — 92523 SPEECH SOUND LANG COMPREHEN: CPT | Mod: GN

## 2019-08-18 PROCEDURE — 70450 CT HEAD/BRAIN W/O DYE: CPT

## 2019-08-18 PROCEDURE — 93306 TTE W/DOPPLER COMPLETE: CPT

## 2019-08-18 PROCEDURE — 83036 HEMOGLOBIN GLYCOSYLATED A1C: CPT

## 2019-08-18 PROCEDURE — 99291 CRITICAL CARE FIRST HOUR: CPT

## 2019-08-18 PROCEDURE — 36415 COLL VENOUS BLD VENIPUNCTURE: CPT

## 2019-08-18 PROCEDURE — 70551 MRI BRAIN STEM W/O DYE: CPT

## 2019-08-18 PROCEDURE — 70450 CT HEAD/BRAIN W/O DYE: CPT | Mod: 26,59

## 2019-08-18 PROCEDURE — 80048 BASIC METABOLIC PNL TOTAL CA: CPT

## 2019-08-18 PROCEDURE — 83735 ASSAY OF MAGNESIUM: CPT

## 2019-08-18 PROCEDURE — 85027 COMPLETE CBC AUTOMATED: CPT

## 2019-08-18 PROCEDURE — 97116 GAIT TRAINING THERAPY: CPT | Mod: GP

## 2019-08-18 PROCEDURE — 80061 LIPID PANEL: CPT

## 2019-08-18 PROCEDURE — 71045 X-RAY EXAM CHEST 1 VIEW: CPT | Mod: 26

## 2019-08-18 PROCEDURE — 70498 CT ANGIOGRAPHY NECK: CPT | Mod: 26

## 2019-08-18 PROCEDURE — 97530 THERAPEUTIC ACTIVITIES: CPT | Mod: GP

## 2019-08-18 PROCEDURE — 93971 EXTREMITY STUDY: CPT | Mod: LT

## 2019-08-18 PROCEDURE — 92610 EVALUATE SWALLOWING FUNCTION: CPT | Mod: GN

## 2019-08-18 PROCEDURE — 84100 ASSAY OF PHOSPHORUS: CPT

## 2019-08-18 PROCEDURE — 97161 PT EVAL LOW COMPLEX 20 MIN: CPT | Mod: GP

## 2019-08-18 PROCEDURE — 99223 1ST HOSP IP/OBS HIGH 75: CPT

## 2019-08-18 PROCEDURE — 70496 CT ANGIOGRAPHY HEAD: CPT | Mod: 26

## 2019-08-18 PROCEDURE — 93010 ELECTROCARDIOGRAM REPORT: CPT

## 2019-08-18 PROCEDURE — 82962 GLUCOSE BLOOD TEST: CPT

## 2019-08-18 RX ORDER — CLOPIDOGREL BISULFATE 75 MG/1
1 TABLET, FILM COATED ORAL
Qty: 0 | Refills: 0 | DISCHARGE

## 2019-08-18 RX ORDER — GLUCAGON INJECTION, SOLUTION 0.5 MG/.1ML
1 INJECTION, SOLUTION SUBCUTANEOUS ONCE
Refills: 0 | Status: DISCONTINUED | OUTPATIENT
Start: 2019-08-18 | End: 2019-08-22

## 2019-08-18 RX ORDER — HYDRALAZINE HCL 50 MG
10 TABLET ORAL EVERY 6 HOURS
Refills: 0 | Status: DISCONTINUED | OUTPATIENT
Start: 2019-08-18 | End: 2019-08-22

## 2019-08-18 RX ORDER — AMLODIPINE BESYLATE 2.5 MG/1
10 TABLET ORAL DAILY
Refills: 0 | Status: DISCONTINUED | OUTPATIENT
Start: 2019-08-18 | End: 2019-08-22

## 2019-08-18 RX ORDER — METFORMIN HYDROCHLORIDE 850 MG/1
1 TABLET ORAL
Qty: 0 | Refills: 0 | DISCHARGE

## 2019-08-18 RX ORDER — INSULIN GLARGINE 100 [IU]/ML
30 INJECTION, SOLUTION SUBCUTANEOUS
Qty: 0 | Refills: 0 | DISCHARGE

## 2019-08-18 RX ORDER — SODIUM CHLORIDE 9 MG/ML
1000 INJECTION, SOLUTION INTRAVENOUS
Refills: 0 | Status: DISCONTINUED | OUTPATIENT
Start: 2019-08-18 | End: 2019-08-22

## 2019-08-18 RX ORDER — DEXTROSE 50 % IN WATER 50 %
25 SYRINGE (ML) INTRAVENOUS ONCE
Refills: 0 | Status: DISCONTINUED | OUTPATIENT
Start: 2019-08-18 | End: 2019-08-22

## 2019-08-18 RX ORDER — LEVOTHYROXINE SODIUM 125 MCG
1 TABLET ORAL
Qty: 0 | Refills: 0 | DISCHARGE

## 2019-08-18 RX ORDER — INSULIN LISPRO 100/ML
VIAL (ML) SUBCUTANEOUS EVERY 6 HOURS
Refills: 0 | Status: DISCONTINUED | OUTPATIENT
Start: 2019-08-18 | End: 2019-08-19

## 2019-08-18 RX ORDER — ATORVASTATIN CALCIUM 80 MG/1
80 TABLET, FILM COATED ORAL AT BEDTIME
Refills: 0 | Status: DISCONTINUED | OUTPATIENT
Start: 2019-08-18 | End: 2019-08-22

## 2019-08-18 RX ORDER — DEXTROSE 50 % IN WATER 50 %
12.5 SYRINGE (ML) INTRAVENOUS ONCE
Refills: 0 | Status: DISCONTINUED | OUTPATIENT
Start: 2019-08-18 | End: 2019-08-22

## 2019-08-18 RX ORDER — LOSARTAN/HYDROCHLOROTHIAZIDE 100MG-25MG
1 TABLET ORAL
Qty: 0 | Refills: 0 | DISCHARGE

## 2019-08-18 RX ORDER — LEVOTHYROXINE SODIUM 125 MCG
75 TABLET ORAL DAILY
Refills: 0 | Status: DISCONTINUED | OUTPATIENT
Start: 2019-08-18 | End: 2019-08-22

## 2019-08-18 RX ORDER — ACETAMINOPHEN 500 MG
650 TABLET ORAL EVERY 6 HOURS
Refills: 0 | Status: DISCONTINUED | OUTPATIENT
Start: 2019-08-18 | End: 2019-08-22

## 2019-08-18 RX ORDER — LABETALOL HCL 100 MG
1 TABLET ORAL
Qty: 0 | Refills: 0 | DISCHARGE

## 2019-08-18 RX ORDER — LOSARTAN POTASSIUM 100 MG/1
100 TABLET, FILM COATED ORAL DAILY
Refills: 0 | Status: DISCONTINUED | OUTPATIENT
Start: 2019-08-18 | End: 2019-08-22

## 2019-08-18 RX ORDER — ALTEPLASE 100 MG
7.7 KIT INTRAVENOUS ONCE
Refills: 0 | Status: COMPLETED | OUTPATIENT
Start: 2019-08-18 | End: 2019-08-18

## 2019-08-18 RX ORDER — AMLODIPINE BESYLATE 2.5 MG/1
1 TABLET ORAL
Qty: 0 | Refills: 0 | DISCHARGE

## 2019-08-18 RX ORDER — LABETALOL HCL 100 MG
200 TABLET ORAL
Refills: 0 | Status: DISCONTINUED | OUTPATIENT
Start: 2019-08-18 | End: 2019-08-22

## 2019-08-18 RX ORDER — DEXTROSE 50 % IN WATER 50 %
15 SYRINGE (ML) INTRAVENOUS ONCE
Refills: 0 | Status: DISCONTINUED | OUTPATIENT
Start: 2019-08-18 | End: 2019-08-22

## 2019-08-18 RX ORDER — ONDANSETRON 8 MG/1
4 TABLET, FILM COATED ORAL EVERY 6 HOURS
Refills: 0 | Status: DISCONTINUED | OUTPATIENT
Start: 2019-08-18 | End: 2019-08-22

## 2019-08-18 RX ORDER — SODIUM CHLORIDE 9 MG/ML
1000 INJECTION INTRAMUSCULAR; INTRAVENOUS; SUBCUTANEOUS
Refills: 0 | Status: DISCONTINUED | OUTPATIENT
Start: 2019-08-18 | End: 2019-08-19

## 2019-08-18 RX ORDER — ALTEPLASE 100 MG
69.3 KIT INTRAVENOUS ONCE
Refills: 0 | Status: COMPLETED | OUTPATIENT
Start: 2019-08-18 | End: 2019-08-18

## 2019-08-18 RX ADMIN — SODIUM CHLORIDE 75 MILLILITER(S): 9 INJECTION INTRAMUSCULAR; INTRAVENOUS; SUBCUTANEOUS at 20:13

## 2019-08-18 RX ADMIN — ATORVASTATIN CALCIUM 80 MILLIGRAM(S): 80 TABLET, FILM COATED ORAL at 21:02

## 2019-08-18 RX ADMIN — ALTEPLASE 69.3 MILLIGRAM(S): KIT at 17:41

## 2019-08-18 RX ADMIN — Medication 1: at 23:28

## 2019-08-18 RX ADMIN — ALTEPLASE 69.3 MILLIGRAM(S): KIT at 16:41

## 2019-08-18 RX ADMIN — ALTEPLASE 462 MILLIGRAM(S): KIT at 16:40

## 2019-08-18 RX ADMIN — ALTEPLASE 7.7 MILLIGRAM(S): KIT at 16:41

## 2019-08-18 NOTE — ED PROVIDER NOTE - PROGRESS NOTE DETAILS
Dimas Esparza for attending Dr. Phelan: Spoke with neuro PA Max. Discussed the risks and benefits of TPA with pt and family at bedside. Pt and family agree. Will administer TPA. I Fior Esparza attest that this documentation has been prepared under the direction and in the presence of Doctor Phelan. Dimas Esparza for attending Dr. Phelan: TPA given a 16:41. No complications. Pt showing immediate improvement. Mental status improved. ICU accepted pt. tPA given. Patient is improved post tPA. NSG consultation appreciated. Family aware, present during the injection of tPA and consenting. Patient to be admitted to ICU pending further evaluation and care. ICU consult and admission by Dr. Estrella is appreciated.

## 2019-08-18 NOTE — H&P ADULT - NSICDXPASTMEDICALHX_GEN_ALL_CORE_FT
PAST MEDICAL HISTORY:  CVA (cerebral vascular accident) with residual loss of peripheral vision in Rt eye    CVA (cerebral vascular accident)     DM (diabetes mellitus)     DM (diabetes mellitus)     HTN (hypertension)     HTN (hypertension)

## 2019-08-18 NOTE — ED ADULT NURSE NOTE - NSIMPLEMENTINTERV_GEN_ALL_ED
Implemented All Fall with Harm Risk Interventions:  [place] to call system. Call bell, personal items and telephone within reach. Instruct patient to call for assistance. Room bathroom lighting operational. Non-slip footwear when patient is off stretcher. Physically safe environment: no spills, clutter or unnecessary equipment. Stretcher in lowest position, wheels locked, appropriate side rails in place. Provide visual cue, wrist band, yellow gown, etc. Monitor gait and stability. Monitor for mental status changes and reorient to person, place, and time. Review medications for side effects contributing to fall risk. Reinforce activity limits and safety measures with patient and family. Provide visual clues: red socks.

## 2019-08-18 NOTE — ED PROVIDER NOTE - NS_ ATTENDINGSCRIBEDETAILS _ED_A_ED_FT
I Robbi Phelan MD saw and examined the patient. Scribe documented for me and under my supervision. I have modified the scribe's documentation where necessary to reflect my history, physical exam and other relevant documentations pertinent to the care of the patient.

## 2019-08-18 NOTE — H&P ADULT - HISTORY OF PRESENT ILLNESS
63F PMHx of DM, hypothyroidism, HTN, CVA (21 years ago, taken off ASA and coumadin 15 years ago) who presents to the ED c/o difficulty speaking, aphasia, Right facial weakness, Dysarthric speech.  Left hemianopia on exam.  Pt's last known well was about 1400 on 9/18/19, with improvement in speech and right upper extremity power.  Initially weaker in RUE and without speech. Pt with recent D/C from  on 2/14/19 after admission for aphasia and difficulty writing and elevated sbp 200's with suspected stroke type symptoms in the past.  CTH demonstrates acute stroke symptoms in the left corona radiata per preliminary report. CTA results officially pending but no obvious signs of LVO.    s/p CVA, s/p TPA with clinical improvement noted.    ICU Vital Signs Last 24 Hrs  T(C): 37 (18 Aug 2019 16:17), Max: 37 (18 Aug 2019 16:17)  T(F): 98.6 (18 Aug 2019 16:17), Max: 98.6 (18 Aug 2019 16:17)  HR: 80 (18 Aug 2019 18:34) (80 - 92)  BP: 158/79 (18 Aug 2019 18:34) (148/83 - 187/95)  RR: 19 (18 Aug 2019 18:34) (18 - 19)  SpO2: 100% (18 Aug 2019 18:34) (97% - 100%)

## 2019-08-18 NOTE — ED ADULT NURSE NOTE - OBJECTIVE STATEMENT
pt presents to ED BIB daughter reporting acute onset at 1400 of R sided facial weakness and difficulty speaking. code stroke called. pt takes asa, plavix daily. hx htn, hld, prior CVAs w/ residual weakness pt presents to ED BIB daughter reporting acute onset at 1400 of R sided facial weakness and difficulty speaking. code stroke called. pt takes asa, plavix daily. hx htn, hld, prior CVAs w/ residual weakness. pt a&ox3.

## 2019-08-18 NOTE — ED ADULT NURSE REASSESSMENT NOTE - NS ED NURSE REASSESS COMMENT FT1
TPA administered by MD Tapan kerr/ assistance of charge FARHAN Naik and author RN at bedside. PIV's placed x 2 prior to administration of TPA.

## 2019-08-18 NOTE — ED ADULT NURSE REASSESSMENT NOTE - NS ED NURSE REASSESS COMMENT FT1
pt showing signs of improvement, speaking in 2-3 word sentences, improvement in facial asymmetry noted. will cont to monitor.

## 2019-08-18 NOTE — ED PROVIDER NOTE - OBJECTIVE STATEMENT
65 y/o female with a PMHx of CVA, HTN, DM presents to the ED c/o HTN x today .Pt states that she took her pressure at home, which was high. Pt also states that today she started to have some difficulty speaking today. States she had difficulty getting words out. Daughter states that symptoms started around 1pm. Pt is a poor historian due to stroke like symptoms, hx taken from triage note. 63 y/o female with a PMHx of CVA, HTN, DM presents to the ED c/o HTN x today .Pt states that she took her pressure at home, which was high. Pt also states that today she started to have some difficulty speaking. States she had difficulty getting words out or articulating her thoughts. Daughter states that symptoms started around 1pm. Pt is a poor historian.

## 2019-08-18 NOTE — ED PROVIDER NOTE - CARE PLAN
[facility] 
 
 
 [address], [address] 
[phone] Patient: Massimo Estrada MRN: [id number] INT:[id number]/96/[id number] Visit Information Date & Time Provider Department Dept. Phone Encounter #  
 6/29/2018  1:30 PM Tamika Paul, [address] [address] [phone] [id number] Upcoming Health Maintenance Date Due Hepatitis B Peds Age 0-18 (1 of 3 - Primary Series) 2005 IPV Peds Age 0-24 (1 of 4 - All-IPV Series) 2005 Hepatitis A Peds Age 1-18 (1 of 2 - Standard Series) 4/20/2006 MMR Peds Age 1-18 (1 of 2) 4/20/2006 DTaP/Tdap/Td series (1 - Tdap) 4/20/2012 HPV Age 9Y-34Y (1 of 2 - Male 2-Dose Series) 4/20/2016 MCV through Age 25 (1 of 2) 4/20/2016 Varicella Peds Age 1-18 (1 of 2 - 2 Dose Adolescent Series) 4/20/2018 Influenza Age 5 to Adult 8/1/2018 Allergies as of 6/29/2018  Review Complete On: 6/29/2018 By: Tamika Paul NP No Known Allergies Current Immunizations  Never Reviewed Name Date Rabies Immune Globulin 9/27/2013  7:33 PM  
 Rabies Vaccine IM 9/27/2013  7:35 PM  
  
 Not reviewed this visit You Were Diagnosed With   
  
 Codes Comments Insect bite, initial encounter    -  Primary ICD-10-CM: W57. Brandyn Frazier ICD-9-CM: 919.4, E906.4 Vitals BP Pulse Temp Resp Height(growth percentile) 105/66 (27 %/ 57 %)* (BP 1 Location: Left arm, BP Patient Position: Sitting) 98 98.5 °F (36.9 °C) (Oral) 16 5' 4.75\" (1.645 m) (81 %, Z= 0.87) Weight(growth percentile) SpO2 BMI Smoking Status 109 lb 9.6 oz (49.7 kg) (63 %, Z= 0.32) 99% 18.38 kg/m2 (47 %, Z= -0.08) Never Smoker *BP percentiles are based on NHBPEP's 4th Report Growth percentiles are based on CDC 2-20 Years data. Vitals History BMI and BSA Data Body Mass Index Body Surface Area  
 18.38 kg/m 2 1.51 m 2 Preferred Pharmacy Pharmacy Name Phone [facility] [address] [phone] Your Updated Medication List  
  
   
This list is accurate as of 6/29/18  2:17 PM.  Always use your most recent med list.  
  
  
  
  
 CHILDREN'S TYLENOL 160 mg/5 mL suspension Generic drug:  acetaminophen Take 15 mg/kg by mouth every four (4) hours as needed for Fever. ondansetron 4 mg disintegrating tablet Commonly known as:  ZOFRAN ODT Take 1 Tab by mouth every eight (8) hours as needed for Nausea. Patient Instructions Insect Stings and Bites: Care Instructions Your Care Instructions Stings and bites from bees, wasps, ants, and other insects often cause pain, swelling, redness, and itching. In some people, especially children, the redness and swelling may be worse. It may extend several inches beyond the affected area. But in most cases, stings and bites don't cause reactions all over the body. If you have had a reaction to an insect sting or bite, you are at risk for a reaction if you get stung or bitten again. Follow-up care is a key part of your treatment and safety. Be sure to make and go to all appointments, and call your doctor if you are having problems. It's also a good idea to know your test results and keep a list of the medicines you take. How can you care for yourself at home? · Do not scratch or rub the skin where the sting or bite occurred. · Put a cold pack or ice cube on the area. Put a thin cloth between the ice and your skin. For some people, a paste of baking soda mixed with a little water helps relieve pain and decrease the reaction. · Take an over-the-counter antihistamine, such as diphenhydramine (Benadryl) or loratadine (Claritin), to relieve swelling, redness, and itching. Calamine lotion or hydrocortisone cream may also help.  Do not give antihistamines to your child unless you have checked with the doctor first. 
 · Be safe with medicines. If your doctor prescribed medicine for your allergy, take it exactly as prescribed. Call your doctor if you think you are having a problem with your medicine. You will get more details on the specific medicines your doctor prescribes. · Your doctor may prescribe a shot of epinephrine to carry with you in case you have a severe reaction. Learn how and when to give yourself the shot, and keep it with you at all times. Make sure it has not . · Go to the emergency room anytime you have a severe reaction. Go even if you have given yourself epinephrine and are feeling better. Symptoms can come back. When should you call for help? Call 911 anytime you think you may need emergency care. For example, call if: 
? · You have symptoms of a severe allergic reaction. These may include: 
¨ Sudden raised, red areas (hives) all over your body. ¨ Swelling of the throat, mouth, lips, or tongue. ¨ Trouble breathing. ¨ Passing out (losing consciousness). Or you may feel very lightheaded or suddenly feel weak, confused, or restless. ?Call your doctor now or seek immediate medical care if: 
? · You have symptoms of an allergic reaction not right at the sting or bite, such as: ¨ A rash or small area of hives (raised, red areas on the skin). ¨ Itching. ¨ Swelling. ¨ Belly pain, nausea, or vomiting. ? · You have a lot of swelling around the site (such as your entire arm or leg is swollen). ? · You have signs of infection, such as: 
¨ Increased pain, swelling, redness, or warmth around the sting. ¨ Red streaks leading from the area. ¨ Pus draining from the sting. ¨ A fever. ? Watch closely for changes in your health, and be sure to contact your doctor if: 
? · You do not get better as expected. Where can you learn more? Go to http://antonio-duke.info/. Enter P390 in the search box to learn more about \"Insect Stings and Bites: Care Instructions. \" 
 Current as of: March 20, 2017 Content Version: 11.4 © 3490-4383 Healthwise, [employer]. Care instructions adapted under license by [employer] (which disclaims liability or warranty for this information). If you have questions about a medical condition or this instruction, always ask your healthcare professional. [address] any warranty or liability for your use of this information. Introducing hospitals & HEALTH SERVICES! Dear Parent or Guardian, Thank you for requesting a Hubblr account for your child. With Hubblr, you can view your childs hospital or ER discharge instructions, current allergies, immunizations and much more. In order to access your childs information, we require a signed consent on file. Please see the [employer] department or call [phone] for instructions on completing a Hubblr Proxy request.   
Additional Information If you have questions, please visit the Frequently Asked Questions section of the Hubblr website at [email or web address] Remember, Hubblr is NOT to be used for urgent needs. For medical emergencies, dial 911. Now available from your iPhone and Android! Please provide this summary of care documentation to your next provider. Your primary care clinician is listed as PROVIDER UNKNOWN. If you have any questions after today's visit, please call [phone]. Principal Discharge DX:	Cerebrovascular accident (CVA), unspecified mechanism

## 2019-08-18 NOTE — CONSULT NOTE ADULT - REASON FOR ADMISSION
Right facial weakness and speech difficulties Right facial weakness and speech difficulties, Right arm weakness

## 2019-08-18 NOTE — PHARMACOTHERAPY INTERVENTION NOTE - COMMENTS
Completed medication history. Spoke with patient and family members regarding medication list. Also referred to Dr. First.

## 2019-08-18 NOTE — CONSULT NOTE ADULT - SUBJECTIVE AND OBJECTIVE BOX
Neurology:    63F PMHx of DM, hypothyroidism, HTN, CVA (21 years ago, taken off ASA and coumadin 15 years ago) who presents to the ED c/o difficulty speaking, aphasia, Right facial weakness, Dysarthric speech.  Left hemianopia on exam.  Pt's last known well was about 1 hour 15 minuts ago with improvement in speech and right upper extremety power.  Initially weaker in RUE and without speech. Pt with recent D/C from  on 2/14/19 after admission for aphasia and difficulty writing and elevated sbp 200's with suspected stroke type symptoms in the past.    NIHSS: 4    INPUTS:  1A: Level of consciousness —> 0 = Alert; keenly responsive  1B: Ask month and age —> 0 = Both questions right  1C: 'Blink eyes' & 'squeeze hands' —> 0 = Performs both tasks  2: Horizontal extraocular movements —> 0 = Normal  3: Visual fields —> 0 = No visual loss  4: Facial palsy —> 2 = Partial paralysis (lower face)  5A: Left arm motor drift —> 0 = No drift for 10 seconds  5B: Right arm motor drift —> 0 = No drift for 10 seconds  6A: Left leg motor drift —> 0 = No drift for 5 seconds  6B: Right leg motor drift —> 0 = No drift for 5 seconds  7: Limb Ataxia —> 0 = No ataxia  8: Sensation —> 0 = Normal; no sensory loss  9: Language/aphasia —> 0 = Normal; no aphasia  10: Dysarthria —> 1 = Mild-moderate dysarthria: slurring but can be understood  11: Extinction/inattention —> 1 = Visual/tactile/auditory/spatial/personal inattention      Past Medical History:  CVA (cerebral vascular accident)  with residual loss of peripheral vision in Rt eye  DM (diabetes mellitus)    HTN (hypertension).    Past Surgical History:  History of hysterectomy.    Tobacco Usage:  · Tobacco Usage	Unknown if ever smoked	    ALLERGIES AND HOME MEDICATIONS:   Allergies:        Allergies:  	No Known Allergies:     Home Medications:   * Patient Currently Takes Medications as of 14-Feb-2019 13:54 documented in Structured Notes - will acquire present medication history  · 	NovoLOG FlexPen 100 units/mL injectable solution: 10 unit(s) injectable 3 times a day (before meals)   · 	Lantus Solostar Pen 100 units/mL subcutaneous solution: 30  subcutaneous once a day   · 	atorvastatin 80 mg oral tablet: 1 tab(s) orally once a day (at bedtime)  · 	aspirin 81 mg oral delayed release tablet: 1 tab(s) orally once a day  · 	clopidogrel 75 mg oral tablet: 1 tab(s) orally once a day  · 	labetalol 200 mg oral tablet: 1 tab(s) orally 2 times a day  · 	amLODIPine 10 mg oral tablet: 1 tab(s) orally once a day  · 	losartan-hydroCHLOROthiazide 100 mg-25 mg oral tablet: 1 tab(s) orally once a day  · 	Synthroid 75 mcg (0.075 mg) oral tablet: 1 tab(s) orally once a day      Vital Signs Last 24 Hrs  T(C): --  T(F): --  HR: --  BP: --  BP(mean): --  RR: --  SpO2: --    Radiology report:  - CT head:     MRI from Feb 2019:   COMPARISON: MR brain dated 3/13/2013    FINDINGS:  Moderate multifocal areas of stenosis right PCA P3 segment. Moderate   stenosis in the distal vertebral artery just proximal to the   vertebrobasilar junction. Mild stenosis in the left MCA bifurcation.    The [place] and vertebrobasilar system shows no other evidence   of significant stenosis, occlusion or saccular aneurysm dilation. No   evidence for arterial venous malformation. The vertebral arteries are   codominant.    IMPRESSION: Intracranial atherosclerosis. No large vessel occlusion.      Physical Exam:  Constitutional: Awake / alert  HEENT: PERRLA, EOMI  Neck: Supple  Respiratory: Breath sounds are clear bilaterally  Cardiovascular: S1 and S2, regular rhythm  Gastrointestinal: Soft, NT/ND  Extremities:  no edema  Vascular: No carotid Bruit  Musculoskeletal: no joint swelling/tenderness, no abnormal movements  Skin: No rashes    Neurological Exam:  HF: A x O x 3, appropriately interactive, normal affect, speech fluent, no aphasia or paraphasic errors. Naming /repetition intact   CN: PERRL, EOMI, VFF, facial sensation normal, no NLFD, tongue midline  Motor: No pronator drift, Strength 5/5 in all 4 ext, normal bulk and tone, no tremor, rigidity or bradykinesia  Sens: Intact to light touch  Reflexes: Symmetric and normal, downgoing toes b/l  Coord:  No FNFA, dysmetria, THIAGO intact   Gait/Balance: Cannot test    A/P:  63F PMHx of DM, hypothyroidism, HTN, CVA (21 years ago, taken off ASA and coumadin 15 years ago) who presents to the ED c/o difficulty speaking, aphasia, Right facial weakness, Dysarthric speech.  Left hemianopia on exam. Pt with recent D/C from  on 2/14/19 after admission for aphasia and difficulty writing.    - IV tpa given because…  - ASA/ PLavix  - Statin  - Dysphagia screen  - DVT prophylaxia  - MRI/A brain-carotids  - PT eval  - Speech/swallow eval    Total Critical Care Time spent: Neurology:    63F PMHx of DM, hypothyroidism, HTN, CVA (21 years ago, taken off ASA and coumadin 15 years ago) who presents to the ED c/o difficulty speaking, aphasia, Right facial weakness, Dysarthric speech.  Left hemianopia on exam.  Pt's last known well was about 1400 on 9/18/19, with improvement in speech and right upper extremity power.  Initially weaker in RUE and without speech. Pt with recent D/C from  on 2/14/19 after admission for aphasia and difficulty writing and elevated sbp 200's with suspected stroke type symptoms in the past.  CTH demonstrates acute stroke symptoms in the left corona radiata per preliminary report. CTA results officially pending but no obvious signs of LVO.      NIHSS: 5    INPUTS:  1A: Level of consciousness —> 0 = Alert; keenly responsive  1B: Ask month and age —> 1 = One questions right, Not year 1955 and President [place]  1C: 'Blink eyes' & 'squeeze hands' —> 0 = Performs both tasks  2: Horizontal extraocular movements —> 0 = Normal  3: Visual fields —> 1 = Left Partial ezequiel-anopsia  4: Facial palsy —> 2 = Partial paralysis (lower face)  5A: Left arm motor drift —> 0 = No drift for 10 seconds  5B: Right arm motor drift —> 0 = No drift for 10 seconds  6A: Left leg motor drift —> 0 = No drift for 5 seconds  6B: Right leg motor drift —> 0 = No drift for 5 seconds  7: Limb Ataxia —> 0 = No ataxia  8: Sensation —> 0 = Normal; no sensory loss  9: Language/aphasia —> 0 = Normal; no aphasia  10: Dysarthria —> 0 = No Dysarthria  11: Extinction/inattention —> 1 = Visual/tactile/auditory/spatial/personal inattention.  Left 2 point discimination loss in LLE.      Past Medical History:  CVA (cerebral vascular accident)  with residual loss of peripheral vision in Rt eye  DM (diabetes mellitus)    HTN (hypertension).    Past Surgical History:  History of hysterectomy.    Tobacco Usage:  · Tobacco Usage	Unknown if ever smoked	    ALLERGIES AND HOME MEDICATIONS:   Allergies:        Allergies:  	No Known Allergies:     Home Medications:     ED Team getting present med list.  + ASA and Plavix daily, last taken this am.     * Patient Currently Takes Medications as of 14-Feb-2019 13:54 documented in Structured Notes - will acquire present medication history  · 	NovoLOG FlexPen 100 units/mL injectable solution: 10 unit(s) injectable 3 times a day (before meals)   · 	Lantus Solostar Pen 100 units/mL subcutaneous solution: 30  subcutaneous once a day   · 	atorvastatin 80 mg oral tablet: 1 tab(s) orally once a day (at bedtime)  · 	aspirin 81 mg oral delayed release tablet: 1 tab(s) orally once a day  · 	clopidogrel 75 mg oral tablet: 1 tab(s) orally once a day  · 	labetalol 200 mg oral tablet: 1 tab(s) orally 2 times a day  · 	amLODIPine 10 mg oral tablet: 1 tab(s) orally once a day  · 	losartan-hydroCHLOROthiazide 100 mg-25 mg oral tablet: 1 tab(s) orally once a day  · 	Synthroid 75 mcg (0.075 mg) oral tablet: 1 tab(s) orally once a day    Vital Signs Last 24 Hrs  T(C): 37 (18 Aug 2019 16:17), Max: 37 (18 Aug 2019 16:17)  T(F): 98.6 (18 Aug 2019 16:17), Max: 98.6 (18 Aug 2019 16:17)  HR: 87 (18 Aug 2019 16:46) (84 - 92)  BP: 165/87 (18 Aug 2019 16:46) (165/87 - 187/95)  BP(mean): --  RR: 19 (18 Aug 2019 16:46) (19 - 19)  SpO2: 100% (18 Aug 2019 16:46) (97% - 100%)    Labs:                        12.6   3.67  )-----------( 154      ( 18 Aug 2019 15:53 )             37.4     08-18    138  |  105  |  17  ----------------------------<  214<H>  3.9   |  27  |  1.07    Ca    9.8      18 Aug 2019 15:53    TPro  8.3  /  Alb  3.9  /  TBili  0.5  /  DBili  x   /  AST  34  /  ALT  49  /  AlkPhos  122<H>  08-18  PT/INR - ( 18 Aug 2019 16:17 )   PT: 10.9 sec;   INR: 0.98 ratio    PTT - ( 18 Aug 2019 16:17 )  PTT:32.2 sec    Radiology report:  - CT head:  IMPRESSION:     Evolving acute infarct in the left corona radiata and left lentiform   nucleus. No hemorrhagic transformation.    These findings were discussed with Dr. Phelan of the patient's clinical   team on 8/18/2019 at approximately 4:05 PM.      MRI from Feb 2019:   COMPARISON: MR brain dated 3/13/2013    FINDINGS:  Moderate multifocal areas of stenosis right PCA P3 segment. Moderate   stenosis in the distal vertebral artery just proximal to the   vertebrobasilar junction. Mild stenosis in the left MCA bifurcation.    The [place] and vertebrobasilar system shows no other evidence   of significant stenosis, occlusion or saccular aneurysm dilation. No   evidence for arterial venous malformation. The vertebral arteries are   codominant.    IMPRESSION: Intracranial atherosclerosis. No large vessel occlusion.      Physical Exam:  Constitutional: Awake / alert  HEENT: PERRLA, EOMI  Neck: Supple  Respiratory: Breath sounds are clear bilaterally  Cardiovascular: S1 and S2, regular rhythm  Gastrointestinal: Soft, NT/ND  Extremities:  no edema  Vascular: No carotid Bruit  Musculoskeletal: no joint swelling/tenderness, no abnormal movements  Skin: No rashes    Neurological Exam:  HF: A x O x 2 (self / Hospital / no year or president. Pt nervous but answers and names objects. Speech clear succinct.  Speech fluent, no aphasia or paraphasic errors. Naming /repetition intact   CN: PERRL, EOMI, VF left challenges in seeing sup / mid / and lower fields. facial sensation normal, no NLFD, tongue midline  Motor: No pronator drift, Strength 5/5 in all 4 ext, normal bulk and tone, no tremor, rigidity or bradykinesia  Sens: Intact to light touch x loss of differentiation between R&L LE's to LT.  Reflexes: Symmetric and normal, downgoing toes b/l  Coord:  No FNFA, dysmetria, THIAGO intact   Gait/Balance: Cannot test    A/P:  63F PMHx of DM, hypothyroidism, HTN, CVA (21 years ago, taken off ASA and coumadin 15 years ago) who presents to the ED c/o difficulty speaking, aphasia, Right facial weakness, Dysarthric speech.  Left hemianopia on exam. Pt with recent D/C from  on 2/14/19 after admission for aphasia and difficulty writing.  NIHSS 5. Improving from initial presentation.  CTH + for acute left corona radiata acute CVA    - IV tpa given because LKW was at 2pm.  Infusion / Needle given at 4:40pm.  Risk and benefits explained at length and in detail to patient and family at bedside.  - ASA/ PLavix continue present dosing  - keep SBP < 180 during immediate post TPA 24 hour time frame  - No blood draws for next 24 hours  - Statin 80mg daily if not on presently  - LFTs in am.  Hx of fatty liver in past  - AM TFT's (Hx of Hypothyroid)  - Aim for blood glucose goals less than 180.  - 2D echo with bubble study should be performed  - Dysphagia screen  - DVT prophylaxis  - MRI/A brain-carotids in am on 8/19  - PT eval  - Speech/swallow eval    Total Critical Care Time spent > 64 minutes in evaluating patient, medical record, imaging studies and implementing neuroprotective measures to avoid further neurological injury and or insult secondary to acute CVA with intervention by ordering and requesting TPA administration by the ED staff and team - Pt's status and care plan d/w Dr. Bland in length and detail.

## 2019-08-18 NOTE — ED ADULT TRIAGE NOTE - CHIEF COMPLAINT QUOTE
daughter states pt has  pt is not completeing sentences or acting at baseline. pt not speaking at tirage, and  answering with low voice. pt has slight droop to smile on right side of face, slight weakness to right , pt denies feeling unwell, denies mtoor or cogntive impairment. daughter states pt was hyypertensive at home as well with BP of 200/110. ot glucose 223 Dr neal at desk for r/o CVA, sy SYMPTOM ONSET 1 HOUR AGO. code stroke called at 1545, pt has hx of cva daughter states pt has  pt is not completeing sentences or acting at baseline. pt not speaking at tirage, and  answering with low voice. pt has slight droop to smile on right side of face, slight weakness to right , pt denies feeling unwell, denies mtoor or cogntive impairment. daughter states pt was hyypertensive at home as well with BP of 200/110. ot glucose 223 Dr neal at desk for r/o CVA, sy SYMPTOM ONSET 1 HOUR AGO. code stroke called at 1545, pt has hx of cva. lamb score 3

## 2019-08-18 NOTE — ED PROVIDER NOTE - NEUROLOGICAL, MLM
Alert and oriented, no focal deficits, no motor or sensory deficits. +partial right sided facial droop, occasional difficulty finding words, left sided hemiparalysis Alert and oriented, no focal deficits, no motor or sensory deficits. +partial right sided facial droop, occasional difficulty finding words, weakness on flexion and extension in left arm and left leg (3/5 arm, 4/5 leg, gradually improved with time and after tPA). NIH=5 GCS=15 No nuchal rigidity. Other than facial nerve, other CNs unremarkable.

## 2019-08-18 NOTE — ED ADULT NURSE NOTE - CHIEF COMPLAINT QUOTE
daughter states pt has  pt is not completeing sentences or acting at baseline. pt not speaking at tirage, and  answering with low voice. pt has slight droop to smile on right side of face, slight weakness to right , pt denies feeling unwell, denies mtoor or cogntive impairment. daughter states pt was hyypertensive at home as well with BP of 200/110. ot glucose 223 Dr neal at desk for r/o CVA, sy SYMPTOM ONSET 1 HOUR AGO. code stroke called at 1545, pt has hx of cva. lamb score 3

## 2019-08-19 LAB
CHOLEST SERPL-MCNC: 167 MG/DL — SIGNIFICANT CHANGE UP (ref 10–199)
HBA1C BLD-MCNC: 8.8 % — HIGH (ref 4–5.6)
HDLC SERPL-MCNC: 46 MG/DL — LOW
LIPID PNL WITH DIRECT LDL SERPL: 94 MG/DL — SIGNIFICANT CHANGE UP
TOTAL CHOLESTEROL/HDL RATIO MEASUREMENT: 3.6 RATIO — SIGNIFICANT CHANGE UP (ref 3.3–7.1)
TRIGL SERPL-MCNC: 133 MG/DL — SIGNIFICANT CHANGE UP (ref 10–149)

## 2019-08-19 PROCEDURE — 99233 SBSQ HOSP IP/OBS HIGH 50: CPT

## 2019-08-19 PROCEDURE — 70450 CT HEAD/BRAIN W/O DYE: CPT | Mod: 26

## 2019-08-19 PROCEDURE — 93971 EXTREMITY STUDY: CPT | Mod: 26,LT

## 2019-08-19 RX ORDER — INSULIN LISPRO 100/ML
VIAL (ML) SUBCUTANEOUS AT BEDTIME
Refills: 0 | Status: DISCONTINUED | OUTPATIENT
Start: 2019-08-19 | End: 2019-08-22

## 2019-08-19 RX ORDER — INSULIN LISPRO 100/ML
VIAL (ML) SUBCUTANEOUS
Refills: 0 | Status: DISCONTINUED | OUTPATIENT
Start: 2019-08-19 | End: 2019-08-22

## 2019-08-19 RX ORDER — ASPIRIN/CALCIUM CARB/MAGNESIUM 324 MG
325 TABLET ORAL DAILY
Refills: 0 | Status: DISCONTINUED | OUTPATIENT
Start: 2019-08-19 | End: 2019-08-20

## 2019-08-19 RX ADMIN — Medication 325 MILLIGRAM(S): at 19:04

## 2019-08-19 RX ADMIN — Medication 75 MICROGRAM(S): at 05:11

## 2019-08-19 RX ADMIN — Medication 1: at 06:06

## 2019-08-19 RX ADMIN — Medication 4: at 11:45

## 2019-08-19 RX ADMIN — Medication 200 MILLIGRAM(S): at 05:11

## 2019-08-19 RX ADMIN — AMLODIPINE BESYLATE 10 MILLIGRAM(S): 2.5 TABLET ORAL at 05:10

## 2019-08-19 RX ADMIN — ATORVASTATIN CALCIUM 80 MILLIGRAM(S): 80 TABLET, FILM COATED ORAL at 22:13

## 2019-08-19 RX ADMIN — LOSARTAN POTASSIUM 100 MILLIGRAM(S): 100 TABLET, FILM COATED ORAL at 05:10

## 2019-08-19 RX ADMIN — Medication 6: at 17:03

## 2019-08-19 RX ADMIN — Medication 200 MILLIGRAM(S): at 17:03

## 2019-08-19 NOTE — SWALLOW BEDSIDE ASSESSMENT ADULT - ASR SWALLOW LABIAL MOBILITY
within functional limits/Note that a mild right lip lag was evident but labial strength/agility were functional for speech/deglutition purposes nonetheless.

## 2019-08-19 NOTE — SWALLOW BEDSIDE ASSESSMENT ADULT - SWALLOW EVAL: RECOMMENDED FEEDING/EATING TECHNIQUES
Esther A Collier Patient Age: 55 year old  MESSAGE:   AJ is calling from the appeals department at [facility].  She is calling to state that after careful consideration the appeal has been approved.  She stated that a letter will be sent out and should be arriving in about ten business days relating to the appeal and will contain information regarding who to contact if there are any questions.    Message confirmed with caller.   Routed to providers clinical pool.       WEIGHT AND HEIGHT:   Wt Readings from Last 1 Encounters:   06/06/19 80.3 kg (177 lb)     Ht Readings from Last 1 Encounters:   06/06/19 5' 4\" (1.626 m)     BMI Readings from Last 1 Encounters:   06/06/19 30.38 kg/m²       ALLERGIES:  Latex  Current Outpatient Medications   Medication   • SYNTHROID 100 MCG tablet   • ranitidine (ZANTAC) 300 MG tablet   • calcitRIOL (ROCALTROL) 0.5 MCG capsule   • Multiple Vitamins-Minerals (HAIR SKIN AND NAILS FORMULA PO)   • Biotin w/ Vitamins C & E (HAIR/SKIN/NAILS PO)   • Glucosamine-Chondroit-Vit C-Mn (GLUCOSAMINE 1500 COMPLEX PO)   • Multiple Vitamin (MULTIVITAMIN) tablet   • Simethicone 125 MG Tab   • Na Sulfate-K Sulfate-Mg Sulf 17.5-3.13-1.6 GM/177ML Solution   • acyclovir (ZOVIRAX) 5 % ointment   • omeprazole (PRILOSEC) 40 MG capsule   • Unable to Find Medication   • Magnesium 500 MG Cap   • calcium (OYST-DIANDRA) 500 MG tablet     No current facility-administered medications for this visit.      PHARMACY to use:           Pharmacy preference(s) on file:   [id number] - [place], [place] - [address]  [address]  [facility] [address]  Phone: [phone] Fax: [phone]      CALL BACK INFO: Ok to leave response (including medical information) on answering machine  ROUTING: Patient's physician/staff        PCP: Naheed Hernandez MD         INS: Payor: [employer] [place] / Plan: [facility] / Product Type: [place] Risk   PATIENT ADDRESS:  [address] [place], [place] [id number]   position upright (90 degrees)

## 2019-08-19 NOTE — SWALLOW BEDSIDE ASSESSMENT ADULT - SWALLOW EVAL: DIAGNOSIS
1) The patient exhibits functional Oropharyngeal Swallowing abilities for age without overt behavioral aspiration signs. Swallow status appears to be stable.  2) The pt was alert and interactive. At the present time, she is oriented x3+ and able to verbalize during communicative probes as well as in conversation while demonstrating functional motor speech and functional linguistic abilities. The pt is able to effectively verbalize her needs and feels she is at communicative baseline.

## 2019-08-19 NOTE — SWALLOW BEDSIDE ASSESSMENT ADULT - ADDITIONAL RECOMMENDATIONS
1) NEURO CONSULT  FOLLOW UP    2) NUTRITION FOLLOW UP AS PATIENT WITH A HISTORY OF DM AS WELL AS HTN. 1) NEURO FOLLOW UP    2) NUTRITION FOLLOW UP AS PATIENT WITH A HISTORY OF DM AS WELL AS HTN.

## 2019-08-19 NOTE — SWALLOW BEDSIDE ASSESSMENT ADULT - COMMENTS
The patient was admitted to  with right facial droop and difficulties speaking suspect for a CVA for which TPA was provided. Note that she was hospitalized at this facility in 2/19 with recurrent episodes of difficulties verbally expressing herself/difficulties attempting to write words which were accompanied by left index finger numbness. Imaging(head CT) in hospital was notable for old right PCA infarcts/old lacunar brainstem infarcts. While in hospital, hematuria was noted. This profile is superimposed upon a history of HTN, DM, hypothyroidism, previous hysterectomy and past remote CVA with residual left homonymous hemianopsia. The patient was admitted to  with right facial droop and difficulties speaking suspect for a CVA for which TPA was provided. Intracranial stenosis and vertebrobasilar stenosis was evident on imaging. Note that she was hospitalized at this facility in 2/19 with recurrent episodes of difficulties verbally expressing herself/difficulties attempting to write words which were accompanied by left index finger numbness that resolved. Imaging(head CT) in hospital was notable for old right PCA infarcts/old lacunar brainstem infarcts. While in hospital, hematuria was noted. This profile is superimposed upon a history of HTN, DM, hypothyroidism, previous hysterectomy and past remote CVA's with residual left homonymous hemianopsia.

## 2019-08-19 NOTE — SWALLOW BEDSIDE ASSESSMENT ADULT - SLP GENERAL OBSERVATIONS
On encounter, a left homonymous hemianopsia was noted and a mild right lip lag was evident. The pt was alert and interactive.  At the present time, she is oriented x3+ and able to verbalize during communicative probes as well as in conversation while demonstrating functional motor speech and functional linguistic abilities. The pt is able to effectively verbalize her needs and feels she is at communicative baseline. Note that the pt denied Dysphagia when asked.

## 2019-08-20 LAB
ANION GAP SERPL CALC-SCNC: 6 MMOL/L — SIGNIFICANT CHANGE UP (ref 5–17)
BUN SERPL-MCNC: 22 MG/DL — SIGNIFICANT CHANGE UP (ref 7–23)
CALCIUM SERPL-MCNC: 9.2 MG/DL — SIGNIFICANT CHANGE UP (ref 8.5–10.1)
CHLORIDE SERPL-SCNC: 108 MMOL/L — SIGNIFICANT CHANGE UP (ref 96–108)
CO2 SERPL-SCNC: 28 MMOL/L — SIGNIFICANT CHANGE UP (ref 22–31)
CREAT SERPL-MCNC: 0.97 MG/DL — SIGNIFICANT CHANGE UP (ref 0.5–1.3)
GLUCOSE SERPL-MCNC: 190 MG/DL — HIGH (ref 70–99)
HBA1C BLD-MCNC: 8.8 % — HIGH (ref 4–5.6)
HCT VFR BLD CALC: 33.1 % — LOW (ref 34.5–45)
HGB BLD-MCNC: 10.7 G/DL — LOW (ref 11.5–15.5)
MAGNESIUM SERPL-MCNC: 2 MG/DL — SIGNIFICANT CHANGE UP (ref 1.6–2.6)
MCHC RBC-ENTMCNC: 28.6 PG — SIGNIFICANT CHANGE UP (ref 27–34)
MCHC RBC-ENTMCNC: 32.3 GM/DL — SIGNIFICANT CHANGE UP (ref 32–36)
MCV RBC AUTO: 88.5 FL — SIGNIFICANT CHANGE UP (ref 80–100)
PHOSPHATE SERPL-MCNC: 3.5 MG/DL — SIGNIFICANT CHANGE UP (ref 2.5–4.5)
PLATELET # BLD AUTO: 128 K/UL — LOW (ref 150–400)
POTASSIUM SERPL-MCNC: 4 MMOL/L — SIGNIFICANT CHANGE UP (ref 3.5–5.3)
POTASSIUM SERPL-SCNC: 4 MMOL/L — SIGNIFICANT CHANGE UP (ref 3.5–5.3)
RBC # BLD: 3.74 M/UL — LOW (ref 3.8–5.2)
RBC # FLD: 12.9 % — SIGNIFICANT CHANGE UP (ref 10.3–14.5)
SODIUM SERPL-SCNC: 142 MMOL/L — SIGNIFICANT CHANGE UP (ref 135–145)
WBC # BLD: 2.92 K/UL — LOW (ref 3.8–10.5)
WBC # FLD AUTO: 2.92 K/UL — LOW (ref 3.8–10.5)

## 2019-08-20 PROCEDURE — 70551 MRI BRAIN STEM W/O DYE: CPT | Mod: 26

## 2019-08-20 PROCEDURE — 99232 SBSQ HOSP IP/OBS MODERATE 35: CPT | Mod: 25

## 2019-08-20 PROCEDURE — 93285 PRGRMG DEV EVAL SCRMS IP: CPT | Mod: 26

## 2019-08-20 RX ORDER — HEPARIN SODIUM 5000 [USP'U]/ML
5000 INJECTION INTRAVENOUS; SUBCUTANEOUS EVERY 8 HOURS
Refills: 0 | Status: DISCONTINUED | OUTPATIENT
Start: 2019-08-20 | End: 2019-08-22

## 2019-08-20 RX ORDER — ASPIRIN/CALCIUM CARB/MAGNESIUM 324 MG
81 TABLET ORAL DAILY
Refills: 0 | Status: DISCONTINUED | OUTPATIENT
Start: 2019-08-21 | End: 2019-08-22

## 2019-08-20 RX ADMIN — AMLODIPINE BESYLATE 10 MILLIGRAM(S): 2.5 TABLET ORAL at 06:02

## 2019-08-20 RX ADMIN — HEPARIN SODIUM 5000 UNIT(S): 5000 INJECTION INTRAVENOUS; SUBCUTANEOUS at 13:06

## 2019-08-20 RX ADMIN — Medication 4: at 17:39

## 2019-08-20 RX ADMIN — Medication 75 MICROGRAM(S): at 06:02

## 2019-08-20 RX ADMIN — Medication 4: at 11:56

## 2019-08-20 RX ADMIN — Medication 8: at 09:14

## 2019-08-20 RX ADMIN — LOSARTAN POTASSIUM 100 MILLIGRAM(S): 100 TABLET, FILM COATED ORAL at 06:02

## 2019-08-20 RX ADMIN — Medication 325 MILLIGRAM(S): at 11:56

## 2019-08-20 RX ADMIN — ATORVASTATIN CALCIUM 80 MILLIGRAM(S): 80 TABLET, FILM COATED ORAL at 22:25

## 2019-08-20 RX ADMIN — HEPARIN SODIUM 5000 UNIT(S): 5000 INJECTION INTRAVENOUS; SUBCUTANEOUS at 22:25

## 2019-08-20 RX ADMIN — Medication 200 MILLIGRAM(S): at 17:35

## 2019-08-20 NOTE — CHART NOTE - NSCHARTNOTEFT_GEN_A_CORE
Ep service asked to interrogate ILR:    No events recorded since last  session in 2/2019.  Rhythm is NSR.  No atrial fib seen.    Normal ILR function.  Battery status good.

## 2019-08-20 NOTE — CONSULT NOTE ADULT - SUBJECTIVE AND OBJECTIVE BOX
Patient is a 64y old  Female who presents with a chief complaint of Acute CVA (20 Aug 2019 09:14)      HPI:  63F PMHx of DM, hypothyroidism, HTN, CVA (21 years ago, taken off ASA and coumadin 15 years ago) who presents to the ED c/o difficulty speaking, aphasia, Right facial weakness, Dysarthric speech.  Left hemianopia on exam.  Pt's last known well was about 1400 on 9/18/19, with improvement in speech and right upper extremity power.  Initially weaker in RUE and without speech. Pt with recent D/C from  on 2/14/19 after admission for aphasia and difficulty writing and elevated sbp 200's with suspected stroke type symptoms in the past.  CTH demonstrates acute stroke symptoms in the left corona radiata per preliminary report. CTA results officially pending but no obvious signs of LVO.    s/p CVA, s/p TPA with clinical improvement noted.  8/20- still with some difficulty speaking but no other focal deficits     ICU Vital Signs Last 24 Hrs  T(C): 37 (18 Aug 2019 16:17), Max: 37 (18 Aug 2019 16:17)  T(F): 98.6 (18 Aug 2019 16:17), Max: 98.6 (18 Aug 2019 16:17)  HR: 80 (18 Aug 2019 18:34) (80 - 92)  BP: 158/79 (18 Aug 2019 18:34) (148/83 - 187/95)  BP now 128/78  RR: 19 (18 Aug 2019 18:34) (18 - 19)  SpO2: 100% (18 Aug 2019 18:34) (97% - 100%) (18 Aug 2019 18:41)      PAST MEDICAL & SURGICAL HISTORY:  CVA (cerebral vascular accident)  HTN (hypertension)  DM (diabetes mellitus)  CVA (cerebral vascular accident): with residual loss of peripheral vision in Rt eye  DM (diabetes mellitus)  HTN (hypertension)  History of hysterectomy                                    MEDICATIONS  (STANDING):  amLODIPine   Tablet 10 milliGRAM(s) Oral daily  aspirin 325 milliGRAM(s) Oral daily  atorvastatin 80 milliGRAM(s) Oral at bedtime  dextrose 5%. 1000 milliLiter(s) (50 mL/Hr) IV Continuous <Continuous>  dextrose 50% Injectable 12.5 Gram(s) IV Push once  dextrose 50% Injectable 25 Gram(s) IV Push once  dextrose 50% Injectable 25 Gram(s) IV Push once  heparin  Injectable 5000 Unit(s) SubCutaneous every 8 hours  insulin lispro (HumaLOG) corrective regimen sliding scale   SubCutaneous three times a day before meals  insulin lispro (HumaLOG) corrective regimen sliding scale   SubCutaneous at bedtime  labetalol 200 milliGRAM(s) Oral two times a day  levothyroxine 75 MICROGram(s) Oral daily  losartan 100 milliGRAM(s) Oral daily    MEDICATIONS  (PRN):  acetaminophen   Tablet .. 650 milliGRAM(s) Oral every 6 hours PRN Temp greater or equal to 38C (100.4F), Mild Pain (1 - 3), Moderate Pain (4 - 6)  dextrose 40% Gel 15 Gram(s) Oral once PRN Blood Glucose LESS THAN 70 milliGRAM(s)/deciliter  glucagon  Injectable 1 milliGRAM(s) IntraMuscular once PRN Glucose LESS THAN 70 milligrams/deciliter  hydrALAZINE Injectable 10 milliGRAM(s) IV Push every 6 hours PRN SBP> 160  ondansetron Injectable 4 milliGRAM(s) IV Push every 6 hours PRN Nausea and/or Vomiting      FAMILY HISTORY: NC       SOCIAL HISTORY:    CIGARETTES:        Vital Signs Last 24 Hrs  T(C): 36.6 (20 Aug 2019 06:00), Max: 36.8 (19 Aug 2019 21:00)  T(F): 97.8 (20 Aug 2019 06:00), Max: 98.3 (19 Aug 2019 21:00)  HR: 74 (20 Aug 2019 09:00) (59 - 91)  BP: 125/67 (20 Aug 2019 09:00) (116/75 - 143/74)  BP(mean): 85 (20 Aug 2019 09:00) (85 - 106)  RR: 17 (20 Aug 2019 09:00) (16 - 27)  SpO2: 98% (20 Aug 2019 09:00) (95% - 100%)            INTERPRETATION OF TELEMETRY:      ECG:    I&O's Detail    19 Aug 2019 07:01  -  20 Aug 2019 07:00  --------------------------------------------------------  IN:    Oral Fluid: 1320 mL    sodium chloride 0.9%: 379.2 mL  Total IN: 1699.2 mL    OUT:    Voided: 1550 mL  Total OUT: 1550 mL    Total NET: 149.2 mL          LABS:                        10.7   2.92  )-----------( 128      ( 20 Aug 2019 07:00 )             33.1     08-20    142  |  108  |  22  ----------------------------<  190<H>  4.0   |  28  |  0.97    Ca    9.2      20 Aug 2019 07:00  Phos  3.5     08-20  Mg     2.0     08-20    TPro  8.3  /  Alb  3.9  /  TBili  0.5  /  DBili  x   /  AST  34  /  ALT  49  /  AlkPhos  122<H>  08-18    CARDIAC MARKERS ( 18 Aug 2019 15:53 )  <0.015 ng/mL / x     / 232 U/L / x     / x          PT/INR - ( 18 Aug 2019 16:17 )   PT: 10.9 sec;   INR: 0.98 ratio         PTT - ( 18 Aug 2019 16:17 )  PTT:32.2 sec    I&O's Summary    19 Aug 2019 07:01  -  20 Aug 2019 07:00  --------------------------------------------------------  IN: 1699.2 mL / OUT: 1550 mL / NET: 149.2 mL      BNP  RADIOLOGY & ADDITIONAL STUDIES:

## 2019-08-20 NOTE — PHYSICAL THERAPY INITIAL EVALUATION ADULT - PERTINENT HX OF CURRENT PROBLEM, REHAB EVAL
pt presents with inability to speak noted by family--no motor weakness--given TPA in ED and admitted to ICU.  CTA brain (not great study) reveals diffuse vessels narrowing.  NC HCT--L corona radiata and lentiform nucleus evolving CVA. RCTH shows no acute bleed or evidence of territorial stroke. doppler LUE (-)

## 2019-08-20 NOTE — PHYSICAL THERAPY INITIAL EVALUATION ADULT - GENERAL OBSERVATIONS, REHAB EVAL
pt rec'd sitting in Bs chair, dtr present, monitors, SCDs in place, + aphasia, pt able to speak 1-2 words at a time, slow, clear

## 2019-08-20 NOTE — CONSULT NOTE ADULT - ASSESSMENT
Agree with above
63F PMHx of DM, hypothyroidism, HTN, CVA (21 years ago, taken off ASA and coumadin 15 years ago) who presents to the ED c/o difficulty speaking, aphasia, Right facial weakness, Dysarthric speech.  Left hemianopia on exam.  Pt's last known well was about 1400 on 9/18/19, with improvement in speech and right upper extremity power.  Initially weaker in RUE and without speech. Pt with recent D/C from  on 2/14/19 after admission for aphasia and difficulty writing and elevated sbp 200's with suspected stroke type symptoms in the past.  CTH demonstrates acute stroke symptoms in the left corona radiata per preliminary report. CTA results officially pending but no obvious signs of LVO.    s/p CVA, s/p TPA with clinical improvement noted.  8/20- still with some difficulty speaking but no other focal deficits   Hypertensive CVA ? BP was 200/120 when daughter took it at the time of the event , r/o embolic CVA as well . LINQ interrogations in our office have not shown any AFIb as of yet .   1) will call EP to interrogate linq to look for silent AFIB , cont asa/plavix for now   2) will start with a TTE with bubble study to look for PFO but if neuro agrees she may need a ANGELITO to further evaluate  3) continue Labetolol losraten amlodipine for BP control BP much better today   4) cont atorvastatin 80mg daily

## 2019-08-21 PROCEDURE — 99232 SBSQ HOSP IP/OBS MODERATE 35: CPT

## 2019-08-21 PROCEDURE — 93306 TTE W/DOPPLER COMPLETE: CPT | Mod: 26

## 2019-08-21 RX ADMIN — HEPARIN SODIUM 5000 UNIT(S): 5000 INJECTION INTRAVENOUS; SUBCUTANEOUS at 12:25

## 2019-08-21 RX ADMIN — Medication 2: at 08:03

## 2019-08-21 RX ADMIN — LOSARTAN POTASSIUM 100 MILLIGRAM(S): 100 TABLET, FILM COATED ORAL at 06:15

## 2019-08-21 RX ADMIN — ATORVASTATIN CALCIUM 80 MILLIGRAM(S): 80 TABLET, FILM COATED ORAL at 21:19

## 2019-08-21 RX ADMIN — Medication 200 MILLIGRAM(S): at 06:15

## 2019-08-21 RX ADMIN — Medication 200 MILLIGRAM(S): at 18:04

## 2019-08-21 RX ADMIN — AMLODIPINE BESYLATE 10 MILLIGRAM(S): 2.5 TABLET ORAL at 06:15

## 2019-08-21 RX ADMIN — Medication 2: at 18:03

## 2019-08-21 RX ADMIN — HEPARIN SODIUM 5000 UNIT(S): 5000 INJECTION INTRAVENOUS; SUBCUTANEOUS at 21:19

## 2019-08-21 RX ADMIN — HEPARIN SODIUM 5000 UNIT(S): 5000 INJECTION INTRAVENOUS; SUBCUTANEOUS at 06:15

## 2019-08-21 RX ADMIN — Medication 4: at 12:25

## 2019-08-21 RX ADMIN — Medication 75 MICROGRAM(S): at 06:15

## 2019-08-21 RX ADMIN — Medication 81 MILLIGRAM(S): at 12:24

## 2019-08-21 NOTE — PROGRESS NOTE ADULT - EXTREMITIES
Perfect serve message sent to Dr. Alondra Reynoso regarding potassium level of 3.1.
No cyanosis, clubbing or edema
detailed exam
No cyanosis, clubbing or edema

## 2019-08-22 ENCOUNTER — TRANSCRIPTION ENCOUNTER (OUTPATIENT)
Age: 64
End: 2019-08-22

## 2019-08-22 VITALS
OXYGEN SATURATION: 100 % | SYSTOLIC BLOOD PRESSURE: 129 MMHG | DIASTOLIC BLOOD PRESSURE: 64 MMHG | HEART RATE: 66 BPM | RESPIRATION RATE: 18 BRPM

## 2019-08-22 RX ORDER — LOSARTAN POTASSIUM 100 MG/1
1 TABLET, FILM COATED ORAL
Qty: 30 | Refills: 0
Start: 2019-08-22 | End: 2019-09-20

## 2019-08-22 RX ORDER — ASPIRIN/CALCIUM CARB/MAGNESIUM 324 MG
1 TABLET ORAL
Qty: 0 | Refills: 0 | DISCHARGE
Start: 2019-08-22

## 2019-08-22 RX ORDER — CLOPIDOGREL BISULFATE 75 MG/1
1 TABLET, FILM COATED ORAL
Qty: 30 | Refills: 0
Start: 2019-08-22 | End: 2019-09-20

## 2019-08-22 RX ORDER — CLOPIDOGREL BISULFATE 75 MG/1
75 TABLET, FILM COATED ORAL DAILY
Refills: 0 | Status: DISCONTINUED | OUTPATIENT
Start: 2019-08-22 | End: 2019-08-22

## 2019-08-22 RX ADMIN — Medication 4: at 08:08

## 2019-08-22 RX ADMIN — Medication 81 MILLIGRAM(S): at 11:39

## 2019-08-22 RX ADMIN — Medication 200 MILLIGRAM(S): at 05:43

## 2019-08-22 RX ADMIN — LOSARTAN POTASSIUM 100 MILLIGRAM(S): 100 TABLET, FILM COATED ORAL at 05:43

## 2019-08-22 RX ADMIN — HEPARIN SODIUM 5000 UNIT(S): 5000 INJECTION INTRAVENOUS; SUBCUTANEOUS at 05:43

## 2019-08-22 RX ADMIN — Medication 4: at 11:40

## 2019-08-22 RX ADMIN — AMLODIPINE BESYLATE 10 MILLIGRAM(S): 2.5 TABLET ORAL at 05:43

## 2019-08-22 RX ADMIN — Medication 75 MICROGRAM(S): at 05:43

## 2019-08-22 RX ADMIN — CLOPIDOGREL BISULFATE 75 MILLIGRAM(S): 75 TABLET, FILM COATED ORAL at 13:27

## 2019-08-22 NOTE — DISCHARGE NOTE PROVIDER - CARE PROVIDER_API CALL
Leigh Ann Mccloud)  Neurology  [address] [place] [address]  Phone: [phone]  Fax: [phone]  Follow Up Time: 1 week

## 2019-08-22 NOTE — DISCHARGE NOTE PROVIDER - HOSPITAL COURSE
CC: Weakness        HPI:    64y old F with a PMHx of HTN, DM, HL, hypothyroidism, R CVA and TIA in Feb 2019--has loop recorder--presents with inability to speak noted by family--no motor weakness--given TPA in ED and admitted to ICU.  CTA brain (not great study) reveals diffuse vessels narrowing.  NC HCT--L corona radiata and lentiform nucleus evolving CVA.  Pt aphasic, passed swallow, awake and alert, and noted to have swollen L arm--mild tenderness--soft and not tight.   Pt is CVA s/p tPA. She remains with expressive aphasia. She is awake and alert. She has a ILR placed previously. Denies any CP or SOB. Started on ASA 24h post tPA.          8/21: Pt seen on tele floor, stable, offers no complaints.  MR head shows acute 3 x 1.3cm infarction in left putamen/left corona radiata as well as old cortical infarct in right parietal/occipital lobe.  This is day 4 post TPA with improvement in speech and mild right facial droop with right motor deficits.         8/22: Pt doing well, pt ambulating, has good strength in bilateral extremities.  Denies fever, chills, N, V, abd pain, CP, SOB.  BP controlled.   Pt needs strict BP, glucose control.          REVIEW OF SYSTEMS: All other review of systems is negative unless indicated above.        Vital Signs Last 24 Hrs    T(C): 36.5 (22 Aug 2019 05:15), Max: 36.7 (21 Aug 2019 20:00)    T(F): 97.7 (22 Aug 2019 05:15), Max: 98.1 (21 Aug 2019 20:00)    HR: 66 (22 Aug 2019 11:16) (66 - 82)    BP: 129/64 (22 Aug 2019 11:16) (129/64 - 145/74)    BP(mean): --    RR: 18 (22 Aug 2019 11:16) (17 - 18)    SpO2: 100% (22 Aug 2019 11:16) (95% - 100%)        PHYSICAL EXAM:        Constitutional: NAD, awake and alert, well-developed    HEENT: PERR, EOMI, Normal Hearing, MMM    Neck: Soft and supple    Respiratory: Breath sounds are clear bilaterally, No wheezing, rales or rhonchi    Cardiovascular: S1 and S2, regular rate and rhythm, no Murmurs, gallops or rubs    Gastrointestinal: Bowel Sounds present, soft, nontender, nondistended, no guarding, no rebound    Extremities: No peripheral edema    Neurological: A/O x 3, no focal deficits in my limited exam    Skin: No rashes        meds/labs: Reviewed        Assessment and Plan:  63 F PMHx of DM, HTN, CVA (21 years ago, taken off coumadin 15 years ago) presented to the ED c/o difficulty speaking, aphasia, Right facial weakness, Dysarthric speech due to acute CVA.            Acute CVA: Due to intracranial and vertebro-basilar atherosclerosis with multifocal stenosis in setting of malignant hypertension     -Acute left BG infract    -s/p IV tpa    -ASA 81 mg    -plavix    -Statin 80 mg daily     -2D echo with bubble study pending    -PT    -LDL: 94    -f/u with Dr. Mccloud as OP            DM2:    -A1c 8.8    -c/w insulin therapy         HTN:  Stable    -amlodipine, labetalol, losartan        Dispo: Home with outpatient PT, speech therapy.

## 2019-08-22 NOTE — DISCHARGE NOTE NURSING/CASE MANAGEMENT/SOCIAL WORK - NSDCDPATPORTLINK_GEN_ALL_CORE
You can access the [facility], offered by [facility], by registering with the following website: [email or web address]

## 2019-08-22 NOTE — PROGRESS NOTE ADULT - PROVIDER SPECIALTY LIST ADULT
Cardiology
Cardiology
Critical Care
Critical Care
Hospitalist
Neurology
Critical Care

## 2019-08-22 NOTE — DISCHARGE NOTE PROVIDER - NSDCCPCAREPLAN_GEN_ALL_CORE_FT
PRINCIPAL DISCHARGE DIAGNOSIS  Diagnosis: Cerebrovascular accident (CVA), unspecified mechanism  Assessment and Plan of Treatment: Blood pressure control, blood sugar control.  Aspirin, plavix.  Follow up with pcp and neurologist for ongoing care.  physical therapy, speech therapy.      SECONDARY DISCHARGE DIAGNOSES  Diagnosis: Essential hypertension  Assessment and Plan of Treatment: take BP meds as prescribed.    Diagnosis: Type 2 diabetes mellitus with other circulatory complication, with long-term current use of insulin  Assessment and Plan of Treatment: Take insulin therapy as previously prescribed with outpatient follow up.

## 2019-08-22 NOTE — DISCHARGE NOTE NURSING/CASE MANAGEMENT/SOCIAL WORK - NSDCPEPTSTRK_GEN_ALL_CORE
Call 911 for stroke/Prescribed medications/Stroke support groups for patients, families, and friends/Stroke warning signs and symptoms/Signs and symptoms of stroke/Risk factors for stroke/Need for follow up after discharge/Stroke education booklet

## 2019-08-22 NOTE — PROGRESS NOTE ADULT - SUBJECTIVE AND OBJECTIVE BOX
CC: Weakness    64y old F with a PMHx of HTN, DM, HL, hypothyroidism, R CVA and TIA in Feb 2019--has loop recorder--presents with inability to speak noted by family--no motor weakness--given TPA in ED and admitted to ICU.  CTA brain (not great study) reveals diffuse vessels narrowing.  NC HCT--L corona radiata and lentiform nucleus evolving CVA.     8/19:  ICU D # 2.  Still aphasic for the most part.  Passed swallow.  Awake and alert.  Pt noted to have swollen L arm--mild tenderness--soft and not tight.     8/20 - Patient seen and examined. Chart reviewed. Events noted. CVA s/p tPA. She remains with expressive aphasia. She is awake and alert. She has a ILR placed previously. Denies any CP or SOB. Started on ASA 24h post tPA.      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  DM (diabetes mellitus)  HTN (hypertension)  History of hysterectomy      FAMILY HISTORY:      Social Hx:    Allergies    No Known Allergies    Intolerances        64y        ICU Vital Signs Last 24 Hrs  T(C): 36.6 (20 Aug 2019 06:00), Max: 36.8 (19 Aug 2019 21:00)  T(F): 97.8 (20 Aug 2019 06:00), Max: 98.3 (19 Aug 2019 21:00)  HR: 77 (20 Aug 2019 07:00) (59 - 91)  BP: 140/82 (20 Aug 2019 07:00) (116/75 - 143/74)  BP(mean): 101 (20 Aug 2019 07:00) (89 - 106)  ABP: --  ABP(mean): --  RR: 18 (20 Aug 2019 07:00) (16 - 27)  SpO2: 98% (20 Aug 2019 07:00) (95% - 100%)          I&O's Summary    19 Aug 2019 07:01  -  20 Aug 2019 07:00  --------------------------------------------------------  IN: 1699.2 mL / OUT: 1550 mL / NET: 149.2 mL                              10.7   2.92  )-----------( 128      ( 20 Aug 2019 07:00 )             33.1       08-20    142  |  108  |  22  ----------------------------<  190<H>  4.0   |  28  |  0.97    Ca    9.2      20 Aug 2019 07:00  Phos  3.5     08-20  Mg     2.0     08-20    TPro  8.3  /  Alb  3.9  /  TBili  0.5  /  DBili  x   /  AST  34  /  ALT  49  /  AlkPhos  122<H>  08-18      CAPILLARY BLOOD GLUCOSE      POCT Blood Glucose.: 203 mg/dL (20 Aug 2019 07:39)  POCT Blood Glucose.: 189 mg/dL (19 Aug 2019 22:17)  POCT Blood Glucose.: 297 mg/dL (19 Aug 2019 16:54)  POCT Blood Glucose.: 228 mg/dL (19 Aug 2019 11:42)      LIVER FUNCTIONS - ( 18 Aug 2019 15:53 )  Alb: 3.9 g/dL / Pro: 8.3 gm/dL / ALK PHOS: 122 U/L / ALT: 49 U/L / AST: 34 U/L / GGT: x             CARDIAC MARKERS ( 18 Aug 2019 15:53 )  <0.015 ng/mL / x     / 232 U/L / x     / x            PT/INR - ( 18 Aug 2019 16:17 )   PT: 10.9 sec;   INR: 0.98 ratio         PTT - ( 18 Aug 2019 16:17 )  PTT:32.2 sec            MEDICATIONS  (STANDING):  amLODIPine   Tablet 10 milliGRAM(s) Oral daily  aspirin 325 milliGRAM(s) Oral daily  atorvastatin 80 milliGRAM(s) Oral at bedtime  dextrose 5%. 1000 milliLiter(s) (50 mL/Hr) IV Continuous <Continuous>  dextrose 50% Injectable 12.5 Gram(s) IV Push once  dextrose 50% Injectable 25 Gram(s) IV Push once  dextrose 50% Injectable 25 Gram(s) IV Push once  insulin lispro (HumaLOG) corrective regimen sliding scale   SubCutaneous three times a day before meals  insulin lispro (HumaLOG) corrective regimen sliding scale   SubCutaneous at bedtime  labetalol 200 milliGRAM(s) Oral two times a day  levothyroxine 75 MICROGram(s) Oral daily  losartan 100 milliGRAM(s) Oral daily    MEDICATIONS  (PRN):  acetaminophen   Tablet .. 650 milliGRAM(s) Oral every 6 hours PRN Temp greater or equal to 38C (100.4F), Mild Pain (1 - 3), Moderate Pain (4 - 6)  dextrose 40% Gel 15 Gram(s) Oral once PRN Blood Glucose LESS THAN 70 milliGRAM(s)/deciliter  glucagon  Injectable 1 milliGRAM(s) IntraMuscular once PRN Glucose LESS THAN 70 milligrams/deciliter  hydrALAZINE Injectable 10 milliGRAM(s) IV Push every 6 hours PRN SBP> 160  ondansetron Injectable 4 milliGRAM(s) IV Push every 6 hours PRN Nausea and/or Vomiting              Advanced Directives:  Discussed with:    Visit Information:    ** Time is exclusive of billed procedures and/or teaching and/or routine family updates.
CC: Weakness    HPI:  64y old F with a PMHx of HTN, DM, HL, hypothyroidism, R CVA and TIA in Feb 2019--has loop recorder--presents with inability to speak noted by family--no motor weakness--given TPA in ED and admitted to ICU.  CTA brain (not great study) reveals diffuse vessels narrowing.  NC HCT--L corona radiata and lentiform nucleus evolving CVA.  Pt aphasic, passed swallow, awake and alert, and noted to have swollen L arm--mild tenderness--soft and not tight.   Pt is CVA s/p tPA. She remains with expressive aphasia. She is awake and alert. She has a ILR placed previously. Denies any CP or SOB. Started on ASA 24h post tPA.      8/21: Pt seen on tele floor, stable, offers no complaints.  MR head shows acute 3 x 1.3cm infarction in left putamen/left corona radiata as well as old cortical infarct in right parietal/occipital lobe.  This is day 4 post TPA with improvement in speech and mild right facial droop with right motor deficits.     REVIEW OF SYSTEMS: All other review of systems is negative unless indicated above.    Vital Signs Last 24 Hrs  T(C): 36.4 (21 Aug 2019 11:12), Max: 36.6 (20 Aug 2019 19:19)  T(F): 97.6 (21 Aug 2019 11:12), Max: 97.9 (20 Aug 2019 19:19)  HR: 80 (21 Aug 2019 11:12) (71 - 82)  BP: 120/51 (21 Aug 2019 11:12) (120/51 - 139/71)  BP(mean): --  RR: 17 (21 Aug 2019 11:12) (16 - 19)  SpO2: 98% (21 Aug 2019 11:12) (95% - 99%)    PHYSICAL EXAM:    Constitutional: NAD, awake and alert, well-developed  HEENT: PERR, EOMI, Normal Hearing, MMM  Neck: Soft and supple  Respiratory: Breath sounds are clear bilaterally, No wheezing, rales or rhonchi  Cardiovascular: S1 and S2, regular rate and rhythm, no Murmurs, gallops or rubs  Gastrointestinal: Bowel Sounds present, soft, nontender, nondistended, no guarding, no rebound  Extremities: No peripheral edema  Neurological: A/O x 3, no focal deficits in my limited exam, some right upper ext weakness noted  Skin: No rashes    MEDICATIONS  (STANDING):  amLODIPine   Tablet 10 milliGRAM(s) Oral daily  aspirin  chewable 81 milliGRAM(s) Oral daily  atorvastatin 80 milliGRAM(s) Oral at bedtime  dextrose 5%. 1000 milliLiter(s) (50 mL/Hr) IV Continuous <Continuous>  dextrose 50% Injectable 12.5 Gram(s) IV Push once  dextrose 50% Injectable 25 Gram(s) IV Push once  dextrose 50% Injectable 25 Gram(s) IV Push once  heparin  Injectable 5000 Unit(s) SubCutaneous every 8 hours  insulin lispro (HumaLOG) corrective regimen sliding scale   SubCutaneous three times a day before meals  insulin lispro (HumaLOG) corrective regimen sliding scale   SubCutaneous at bedtime  labetalol 200 milliGRAM(s) Oral two times a day  levothyroxine 75 MICROGram(s) Oral daily  losartan 100 milliGRAM(s) Oral daily    MEDICATIONS  (PRN):  acetaminophen   Tablet .. 650 milliGRAM(s) Oral every 6 hours PRN Temp greater or equal to 38C (100.4F), Mild Pain (1 - 3), Moderate Pain (4 - 6)  dextrose 40% Gel 15 Gram(s) Oral once PRN Blood Glucose LESS THAN 70 milliGRAM(s)/deciliter  glucagon  Injectable 1 milliGRAM(s) IntraMuscular once PRN Glucose LESS THAN 70 milligrams/deciliter  hydrALAZINE Injectable 10 milliGRAM(s) IV Push every 6 hours PRN SBP> 160  ondansetron Injectable 4 milliGRAM(s) IV Push every 6 hours PRN Nausea and/or Vomiting                              10.7   2.92  )-----------( 128      ( 20 Aug 2019 07:00 )             33.1     08-20    142  |  108  |  22  ----------------------------<  190<H>  4.0   |  28  |  0.97    Ca    9.2      20 Aug 2019 07:00  Phos  3.5     08-20  Mg     2.0     08-20      CAPILLARY BLOOD GLUCOSE      POCT Blood Glucose.: 240 mg/dL (21 Aug 2019 12:22)  POCT Blood Glucose.: 196 mg/dL (21 Aug 2019 08:01)  POCT Blood Glucose.: 188 mg/dL (20 Aug 2019 22:24)  POCT Blood Glucose.: 228 mg/dL (20 Aug 2019 17:37)    Assessment and Plan:  63 F PMHx of DM, HTN, CVA (21 years ago, taken off coumadin 15 years ago) presented to the ED c/o difficulty speaking, aphasia, Right facial weakness, Dysarthric speech due to acute CVA.      Acute CVA: Due to intracranial and vertebro-basilar atherosclerosis with multifocal stenosis in setting of malignant hypertension   -Acute left BG infract  -s/p IV tpa  -ASA 81 mg  -plavix  -Statin 80 mg daily   -2D echo with bubble study pending  -PT  -LDL: 94  -f/u with Dr. Mccloud as OP      DM2:  -A1c 8.8  -c/w insulin therapy     HTN:  Stable  -amlodipine, labetolol, losartan    DVT ppx:   -early ambulation    Dispo:  -D/c home vs [facility] tomorrow pending results of echo w/ bubble study
Patient is a 64y old  Female who presents with a chief complaint of Acute CVA (20 Aug 2019 13:26)    8/21- denies any symptoms this AM     MEDICATIONS  (STANDING):  amLODIPine   Tablet 10 milliGRAM(s) Oral daily  aspirin  chewable 81 milliGRAM(s) Oral daily  atorvastatin 80 milliGRAM(s) Oral at bedtime  dextrose 5%. 1000 milliLiter(s) (50 mL/Hr) IV Continuous <Continuous>  dextrose 50% Injectable 12.5 Gram(s) IV Push once  dextrose 50% Injectable 25 Gram(s) IV Push once  dextrose 50% Injectable 25 Gram(s) IV Push once  heparin  Injectable 5000 Unit(s) SubCutaneous every 8 hours  insulin lispro (HumaLOG) corrective regimen sliding scale   SubCutaneous three times a day before meals  insulin lispro (HumaLOG) corrective regimen sliding scale   SubCutaneous at bedtime  labetalol 200 milliGRAM(s) Oral two times a day  levothyroxine 75 MICROGram(s) Oral daily  losartan 100 milliGRAM(s) Oral daily    MEDICATIONS  (PRN):  acetaminophen   Tablet .. 650 milliGRAM(s) Oral every 6 hours PRN Temp greater or equal to 38C (100.4F), Mild Pain (1 - 3), Moderate Pain (4 - 6)  dextrose 40% Gel 15 Gram(s) Oral once PRN Blood Glucose LESS THAN 70 milliGRAM(s)/deciliter  glucagon  Injectable 1 milliGRAM(s) IntraMuscular once PRN Glucose LESS THAN 70 milligrams/deciliter  hydrALAZINE Injectable 10 milliGRAM(s) IV Push every 6 hours PRN SBP> 160  ondansetron Injectable 4 milliGRAM(s) IV Push every 6 hours PRN Nausea and/or Vomiting            Vital Signs Last 24 Hrs  T(C): 36.4 (21 Aug 2019 05:35), Max: 36.6 (20 Aug 2019 14:00)  T(F): 97.5 (21 Aug 2019 05:35), Max: 97.9 (20 Aug 2019 19:19)  HR: 71 (21 Aug 2019 05:35) (71 - 82)  BP: 134/64 (21 Aug 2019 05:35) (126/75 - 144/77)  BP(mean): 94 (20 Aug 2019 14:00) (90 - 97)  RR: 16 (21 Aug 2019 05:35) (16 - 26)  SpO2: 99% (21 Aug 2019 05:35) (95% - 100%)            INTERPRETATION OF TELEMETRY:  no arrhytmias  ECG:        LABS:                        10.7   2.92  )-----------( 128      ( 20 Aug 2019 07:00 )             33.1     08-20    142  |  108  |  22  ----------------------------<  190<H>  4.0   |  28  |  0.97    Ca    9.2      20 Aug 2019 07:00  Phos  3.5     08-20  Mg     2.0     08-20              I&O's Summary    20 Aug 2019 07:01  -  21 Aug 2019 07:00  --------------------------------------------------------  IN: 620 mL / OUT: 600 mL / NET: 20 mL      BNP  RADIOLOGY & ADDITIONAL STUDIES:
Patient is a 64y old  Female who presents with a chief complaint of Acute CVA.       HPI:  63F PMHx of DM, hypothyroidism, HTN, CVA (21 years ago, taken off ASA and coumadin 15 years ago) who presents to the ED c/o difficulty speaking, aphasia, Right facial weakness, Dysarthric speech.  Left hemianopia on exam.  Pt's last known well was about 1400 on 9/18/19, with improvement in speech and right upper extremity power.  Initially weaker in RUE and without speech. Pt with recent D/C from  on 2/14/19 after admission for aphasia and difficulty writing and elevated sbp 200's with suspected stroke type symptoms in the past.    CTH demonstrates acute stroke symptoms in the left corona radiata per preliminary report. CTA results officially pending but no obvious signs of LVO.    s/p CVA, s/p TPA with clinical improvement noted.    8/22- pt seen and examined by me today. Pt denies any CP or SOB.           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 1000 milliLiter(s) (50 mL/Hr) IV Continuous <Continuous>  dextrose 50% Injectable 12.5 Gram(s) IV Push once  dextrose 50% Injectable 25 Gram(s) IV Push once  dextrose 50% Injectable 25 Gram(s) IV Push once  heparin  Injectable 5000 Unit(s) SubCutaneous every 8 hours  insulin lispro (HumaLOG) corrective regimen sliding scale   SubCutaneous three times a day before meals  insulin lispro (HumaLOG) corrective regimen sliding scale   SubCutaneous at bedtime  labetalol 200 milliGRAM(s) Oral two times a day  levothyroxine 75 MICROGram(s) Oral daily  losartan 100 milliGRAM(s) Oral daily    MEDICATIONS  (PRN):  acetaminophen   Tablet .. 650 milliGRAM(s) Oral every 6 hours PRN Temp greater or equal to 38C (100.4F), Mild Pain (1 - 3), Moderate Pain (4 - 6)  dextrose 40% Gel 15 Gram(s) Oral once PRN Blood Glucose LESS THAN 70 milliGRAM(s)/deciliter  glucagon  Injectable 1 milliGRAM(s) IntraMuscular once PRN Glucose LESS THAN 70 milligrams/deciliter  hydrALAZINE Injectable 10 milliGRAM(s) IV Push every 6 hours PRN SBP> 160  ondansetron Injectable 4 milliGRAM(s) IV Push every 6 hours PRN Nausea and/or Vomiting      FAMILY HISTORY:      SOCIAL HISTORY:  no recent smoking     REVIEW OF SYSTEMS:  CONSTITUTIONAL:    No fatigue, malaise, lethargy.  No fever or chills.  HEENT:  Eyes:  No visual changes.     ENT:  No epistaxis.  No sinus pain.    RESPIRATORY:  No cough.  No wheeze.  No hemoptysis.  No shortness of breath.  CARDIOVASCULAR:  No chest pains.  No palpitations. No shortness of breath, No orthopnea or PND.  GASTROINTESTINAL:  No abdominal pain.  No nausea or vomiting.    GENITOURINARY:    No hematuria.    MUSCULOSKELETAL:  No musculoskeletal pain.  No joint swelling.  No arthritis.  PSYCHIATRIC:  No confusion        Vital Signs Last 24 Hrs  T(C): 36.5 (22 Aug 2019 05:15), Max: 36.7 (21 Aug 2019 20:00)  T(F): 97.7 (22 Aug 2019 05:15), Max: 98.1 (21 Aug 2019 20:00)  HR: 82 (22 Aug 2019 05:15) (68 - 82)  BP: 145/74 (22 Aug 2019 05:15) (120/51 - 145/74)  BP(mean): --  RR: 17 (22 Aug 2019 05:15) (17 - 18)  SpO2: 99% (22 Aug 2019 05:15) (95% - 99%)    PHYSICAL EXAM-    Constitutional: elderly ill looking female in no acute distress    Head: Head is normocephalic and atraumatic.      Neck: No jugular venous distention. No audible carotid bruits. There are strong carotid pulses bilaterally. No JVD.     Cardiovascular: Regular rate and rhythm without S3, S4. No murmurs or rubs are appreciated.      Respiratory: Breathsounds are normal. No rales. No wheezing.    Abdomen: Soft, nontender, nondistended with positive bowel sounds.      Extremity: No tenderness. No  pitting edema     Neurologic: The patient is alert and oriented.      Skin: No rash, no obvious lesions noted.      Psychiatric: The patient appears to be emotionally stable.      INTERPRETATION OF TELEMETRY: SR, 70-80/min    ECG:    I&O's Detail      LABS:      08-19 Chol 167 LDL 94 HDL 46<L> Trig 133      I&O's Summary    BNP  RADIOLOGY & ADDITIONAL STUDIES:  < from: CT Head No Cont (08.19.19 @ 11:30) >    IMPRESSION:   mild periventricular white matter ischemia unchanged. Old   infarction in the RIGHT parietal lobe is unchanged.                  STEFANIE OSEGUERA M.D., ATTENDING RADIOLOGIST  This document has been electronically signed. Aug 19 2019  2:00PM        < end of copied text >  < from: MR Head No Cont (08.20.19 @ 12:54) >  IMPRESSION:    acute 3 x 1.3 cm infarction in the LEFT putamen extending   into the LEFT corona radiata with restricteddiffusion. Moderate   periventricular and deep and subcortical white matter ischemia. Old   cortical infarction is seen in the RIGHT parietal/occipital lobe. No   acute hemorrhage is seen.    Critical value:  I discussed the finding of this report with Dr. Dr. Estrella at 1:15 PM on 08/20/2019.  Critical value policy of the hospital   was followed.  Read back and confirmation of receipt of this   communication was performed.  This verbal communication supplements the   text report of this document.                STEFANIE OSEGUERA M.D., ATTENDING RADIOLOGIST  This document has been electronically signed. Aug 20 2019  1:16PM        < end of copied text >
Pt s/p TPA for acute stroke, day 3, noted improvement of speech, mild right facial droop and right sensory/motor deficits are persistent.     MR Head + Acute 3 x 1.3 cm infarction in the LEFT putamen / LEFT corona radiata. Moderate   periventricular and deep and subcortical white matter ischemia. Old cortical infarction is seen in the RIGHT  parietal/occipital lobe.    ROS: As above, other ROS Negative       MEDICATIONS  (STANDING):  amLODIPine   Tablet 10 milliGRAM(s) Oral daily  aspirin 325 milliGRAM(s) Oral daily  atorvastatin 80 milliGRAM(s) Oral at bedtime  dextrose 5%. 1000 milliLiter(s) (50 mL/Hr) IV Continuous <Continuous>  dextrose 50% Injectable 12.5 Gram(s) IV Push once  dextrose 50% Injectable 25 Gram(s) IV Push once  dextrose 50% Injectable 25 Gram(s) IV Push once  heparin  Injectable 5000 Unit(s) SubCutaneous every 8 hours  insulin lispro (HumaLOG) corrective regimen sliding scale   SubCutaneous three times a day before meals  insulin lispro (HumaLOG) corrective regimen sliding scale   SubCutaneous at bedtime  labetalol 200 milliGRAM(s) Oral two times a day  levothyroxine 75 MICROGram(s) Oral daily  losartan 100 milliGRAM(s) Oral daily      Vital Signs Last 24 Hrs  T(C): 36.5 (20 Aug 2019 10:00), Max: 36.8 (19 Aug 2019 21:00)  T(F): 97.7 (20 Aug 2019 10:00), Max: 98.3 (19 Aug 2019 21:00)  HR: 78 (20 Aug 2019 13:00) (59 - 91)  BP: 138/77 (20 Aug 2019 13:00) (116/75 - 144/77)  BP(mean): 95 (20 Aug 2019 13:00) (85 - 106)  RR: 26 (20 Aug 2019 13:00) (10 - 27)  SpO2: 98% (20 Aug 2019 13:00) (95% - 100%)     Neurological Exam:  HF: A x O x 3- , Speech fluent, no aphasia or paraphasic errors. Naming /repetition intact   CN: PERRL, EOMI, VF left hemianopsia ((old) right NLFD, tongue midline  Motor: No pronator drift, Strength 4+/5 RUE and RLE, 5/5 in Left UE/LE, normal bulk and tone, no tremor  Sens: decreased PP right side  Reflexes: Symmetric and normal, downgoing toes b/l  Coord:  No FNFA, dysmetria, THIAGO intact   Gait/Balance: Walked with assist         NIHSS - 4      < from: MR Head No Cont (08.20.19 @ 12:54) >  Acute 3 x 1.3 cm infarction in the LEFT putamen extending  into the LEFT corona radiata   with restricted diffusion. Moderate periventricular and deep and subcortical white matter ischemia. Old   cortical infarction is seen in the RIGHT parietal/occipital lobe. No acute hemorrhage is seen.    CT Angio Neck w/ IV Cont (08.18.19 @ 16:01)           CTA NECK: Atherosclerotic plaque at the origin of the left subclavian   artery and right vertebral artery, resulting in focal mild vessel   narrowing. No definite evidence of large vessel occlusion in the neck.    CTA HEAD: Moderate left and mild-to-moderate right narrowing of the   cavernous and supraclinoid ICAs bilaterally. Multifocal moderate to   severe right and moderate left vertebral artery narrowing. Multifocal   mild narrowing of the basilar artery. Anterior, middle and posterior   cerebral arteries are not well evaluated due to poor contrast bolus   timing.                10.7   2.92  )-----------( 128      ( 20 Aug 2019 07:00 )             33.1     08-20    142  |  108  |  22  ----------------------------<  190<H>  4.0   |  28  |  0.97    Ca    9.2      20 Aug 2019 07:00  Phos  3.5     08-20  Mg     2.0     08-20    TPro  8.3  /  Alb  3.9  /  TBili  0.5  /  DBili  x   /  AST  34  /  ALT  49  /  AlkPhos  122<H>  08-18    08-19 [id number] 8.8    08-19 Chol 167 LDL 94 HDL 46<L> Trig 133
Pt s/p TPA for acute stroke, day 4, noted improvement of speech, mild right facial droop and right sensory/motor deficits are persistent.     MR Head + Acute 3 x 1.3 cm infarction in the LEFT putamen / LEFT corona radiata. Old cortical infarction is seen in the RIGHT parietal/occipital lobe.    ROS: As above, other ROS Negative    MEDICATIONS  (STANDING):  amLODIPine   Tablet 10 milliGRAM(s) Oral daily  aspirin  chewable 81 milliGRAM(s) Oral daily  atorvastatin 80 milliGRAM(s) Oral at bedtime  dextrose 5%. 1000 milliLiter(s) (50 mL/Hr) IV Continuous <Continuous>  dextrose 50% Injectable 12.5 Gram(s) IV Push once  dextrose 50% Injectable 25 Gram(s) IV Push once  dextrose 50% Injectable 25 Gram(s) IV Push once  heparin  Injectable 5000 Unit(s) SubCutaneous every 8 hours  insulin lispro (HumaLOG) corrective regimen sliding scale   SubCutaneous three times a day before meals  insulin lispro (HumaLOG) corrective regimen sliding scale   SubCutaneous at bedtime  labetalol 200 milliGRAM(s) Oral two times a day  levothyroxine 75 MICROGram(s) Oral daily  losartan 100 milliGRAM(s) Oral daily      Vital Signs Last 24 Hrs  T(C): 36.4 (21 Aug 2019 11:12), Max: 36.6 (20 Aug 2019 14:00)  T(F): 97.6 (21 Aug 2019 11:12), Max: 97.9 (20 Aug 2019 19:19)  HR: 80 (21 Aug 2019 11:12) (71 - 82)  BP: 120/51 (21 Aug 2019 11:12) (120/51 - 139/71)  BP(mean): 94 (20 Aug 2019 14:00) (94 - 95)  RR: 17 (21 Aug 2019 11:12) (16 - 26)  SpO2: 98% (21 Aug 2019 11:12) (95% - 100%)     Neurological Exam:  HF: A x O x 3- , Speech fluent, no aphasia or paraphasic errors. Naming /repetition intact   CN: PERRL, EOMI, VF left hemianopsia ((old) right NLFD, tongue midline  Motor: No pronator drift, Strength 4+/5 RUE and RLE, 5/5 in Left UE/LE, normal bulk and tone, no tremor  Sens: decreased PP right side  Reflexes: Symmetric and normal, downgoing toes b/l  Coord:  No FNFA, dysmetria, THIAGO intact   Gait/Balance: Walked with assist        NIHSS - 4                        10.7   2.92  )-----------( 128      ( 20 Aug 2019 07:00 )             33.1     08-20    142  |  108  |  22  ----------------------------<  190<H>  4.0   |  28  |  0.97    Ca    9.2      20 Aug 2019 07:00  Phos  3.5     08-20  Mg     2.0     08-20      08-20 [id number] 8.8  08-19 [id number] 8.8    08-19 Chol 167 LDL 94 HDL 46<L> Trig 133
Pt s/p TPA for acute stroke, has noted improvement of speech, mild right facial droop is persistent.     Repeat CT head this morning reveals no acute bleed or evidence of territorial stroke.    ROS: As above, other ROS Negative    MEDICATIONS  (STANDING):  amLODIPine   Tablet 10 milliGRAM(s) Oral daily  atorvastatin 80 milliGRAM(s) Oral at bedtime  dextrose 5%. 1000 milliLiter(s) (50 mL/Hr) IV Continuous <Continuous>  dextrose 50% Injectable 12.5 Gram(s) IV Push once  dextrose 50% Injectable 25 Gram(s) IV Push once  dextrose 50% Injectable 25 Gram(s) IV Push once  insulin lispro (HumaLOG) corrective regimen sliding scale   SubCutaneous three times a day before meals  insulin lispro (HumaLOG) corrective regimen sliding scale   SubCutaneous at bedtime  labetalol 200 milliGRAM(s) Oral two times a day  levothyroxine 75 MICROGram(s) Oral daily  losartan 100 milliGRAM(s) Oral daily      Vital Signs Last 24 Hrs  T(C): 36.9 (19 Aug 2019 05:00), Max: 37 (18 Aug 2019 16:17)  T(F): 98.4 (19 Aug 2019 05:00), Max: 98.6 (18 Aug 2019 16:17)  HR: 73 (19 Aug 2019 13:00) (69 - 92)  BP: 130/75 (19 Aug 2019 13:00) (130/75 - 187/95)  BP(mean): 92 (19 Aug 2019 13:00) (91 - 114)  RR: 23 (19 Aug 2019 13:00) (15 - 27)  SpO2: 100% (19 Aug 2019 13:00) (96% - 100%)     Neurological Exam:  HF: A x O x 3- , Speech fluent, no aphasia or paraphasic errors. Naming /repetition intact   CN: PERRL, EOMI, VF left hemianopsia ((old) right NLFD, tongue midline  Motor: No pronator drift, Strength 5/5 in all 4 ext, normal bulk and tone, no tremor  Sens: Intact to light touch, no neglect  Reflexes: Symmetric and normal, downgoing toes b/l  Coord:  No FNFA, dysmetria, THIAGO intact   Gait/Balance: Cannot test        NIHSS - 2    Labs:                   12.6   3.67  )-----------( 154      ( 18 Aug 2019 15:53 )             37.4     08-18    138  |  105  |  17  ----------------------------<  214<H>  3.9   |  27  |  1.07    Ca    9.8      18 Aug 2019 15:53    TPro  8.3  /  Alb  3.9  /  TBili  0.5  /  DBili  x   /  AST  34  /  ALT  49  /  AlkPhos  122<H>  08-18 08-19 [id number] 8.8    08-19 Chol 167 LDL 94 HDL 46<L> Trig 133    Radiology report:  - CT Head:  < from: CT Head No Cont (08.19.19 @ 11:30) >  mild periventricular white matter ischemia unchanged. Old   infarction in the RIGHT parietal lobe is unchanged.    CT Angio Neck w/ IV Cont (08.18.19 @ 16:01)   IMPRESSION:     Please note, examination is technically limited due to poor contrast   bolus timing.    CTA NECK: Atherosclerotic plaque at the origin of the left subclavian   artery and right vertebral artery, resulting in focal mild vessel   narrowing. No definite evidence of large vessel occlusion in the neck.    CTA HEAD: Moderate left and mild-to-moderate right narrowing of the   cavernous and supraclinoid ICAs bilaterally. Multifocal moderate to   severe right and moderate left vertebral artery narrowing. Multifocal   mild narrowing of the basilar artery. Anterior, middle and posterior   cerebral arteries are not well evaluated due to poor contrast bolus   timing.     < end of copied text >
a verbal handoff was given to dr. Grier. she was updated on patients current status hospital course, plan of care, and prognosis with all questions answered in detail. She is agreeable to accept patient on her service.
CC:  CVA    HPI:    63 y/o female with HTN, DM, HL, hypothyroidism, R CVA and TIA in Feb 2019--has loop recorder--presents with inability to speak noted by family--no motor weakness--given TPA in ED and admitted to ICU.  CTA brain (not great study) reveals diffuse vessels narrowing.  NC HCT--L corona radiata and lentiform nucleus evolving CVA.     8/19:  ICU D # 2.  Still aphasic for the most part.  Passed swallow.  Awake and alert.  Pt noted to have swollen L arm--mild tenderness--soft and not tight.       PMH:  As above.     PSH:  As above.     FH: Non Contributory other than those listed in HPI    Social History:  No ETOH    MEDICATIONS  (STANDING):  amLODIPine   Tablet 10 milliGRAM(s) Oral daily  atorvastatin 80 milliGRAM(s) Oral at bedtime  dextrose 5%. 1000 milliLiter(s) (50 mL/Hr) IV Continuous <Continuous>  dextrose 50% Injectable 12.5 Gram(s) IV Push once  dextrose 50% Injectable 25 Gram(s) IV Push once  dextrose 50% Injectable 25 Gram(s) IV Push once  insulin lispro (HumaLOG) corrective regimen sliding scale   SubCutaneous three times a day before meals  insulin lispro (HumaLOG) corrective regimen sliding scale   SubCutaneous at bedtime  labetalol 200 milliGRAM(s) Oral two times a day  levothyroxine 75 MICROGram(s) Oral daily  losartan 100 milliGRAM(s) Oral daily    MEDICATIONS  (PRN):  acetaminophen   Tablet .. 650 milliGRAM(s) Oral every 6 hours PRN Temp greater or equal to 38C (100.4F), Mild Pain (1 - 3), Moderate Pain (4 - 6)  dextrose 40% Gel 15 Gram(s) Oral once PRN Blood Glucose LESS THAN 70 milliGRAM(s)/deciliter  glucagon  Injectable 1 milliGRAM(s) IntraMuscular once PRN Glucose LESS THAN 70 milligrams/deciliter  hydrALAZINE Injectable 10 milliGRAM(s) IV Push every 6 hours PRN SBP> 160  ondansetron Injectable 4 milliGRAM(s) IV Push every 6 hours PRN Nausea and/or Vomiting      Allergies: NKDA    ROS:  SEE BELOW      Weight (kg): 84.6 (08-18 @ 16:14)    ICU Vital Signs Last 24 Hrs  T(C): 36.9 (19 Aug 2019 05:00), Max: 37 (18 Aug 2019 16:17)  T(F): 98.4 (19 Aug 2019 05:00), Max: 98.6 (18 Aug 2019 16:17)  HR: 78 (19 Aug 2019 10:00) (69 - 92)  BP: 135/74 (19 Aug 2019 10:00) (133/73 - 187/95)  BP(mean): 92 (19 Aug 2019 10:00) (91 - 114)  ABP: --  ABP(mean): --  RR: 16 (19 Aug 2019 10:00) (15 - 27)  SpO2: 100% (19 Aug 2019 10:00) (96% - 100%)          I&O's Summary    18 Aug 2019 07:01  -  19 Aug 2019 07:00  --------------------------------------------------------  IN: 885 mL / OUT: 1450 mL / NET: -565 mL        Physical Exam:  SEE BELOW                          12.6   3.67  )-----------( 154      ( 18 Aug 2019 15:53 )             37.4       08-18    138  |  105  |  17  ----------------------------<  214<H>  3.9   |  27  |  1.07    Ca    9.8      18 Aug 2019 15:53    TPro  8.3  /  Alb  3.9  /  TBili  0.5  /  DBili  x   /  AST  34  /  ALT  49  /  AlkPhos  122<H>  08-18      CARDIAC MARKERS ( 18 Aug 2019 15:53 )  <0.015 ng/mL / x     / 232 U/L / x     / x                    DVT Prophylaxis:                                                            Contraindication:     Advanced Directives:    Discussed with:    Visit Information:  Time spent excluding procedure:      ** Time is exclusive of billed procedures and/or teaching and/or routine family updates.

## 2019-08-22 NOTE — PROGRESS NOTE ADULT - ASSESSMENT
1. CVA- imaging results of the CT and MRI as stated above.  TTE with bubble study pending results.  On ASA and full dose statin.  BP goals per neurology team in the setting of acute CVA.    2. HTN- continue current meds.  BP goals as stated above.    3. DM- Management per primary team.     4. Hyperlipidemia- continue statin.  Goal LDL- 70.    Other medical issues- Management per primary team.   Thank you for allowing me to participate in the care of this patient. Please feel free to contact me with any questions.
63 F PMHx of DM, HTN, CVA (21 years ago, taken off coumadin 15 years ago) presented to the ED c/o difficulty speaking, aphasia, Right facial weakness, Dysarthric speech.  Left hemianopia on exam - is old from prior CVA. In ED,  NIHSS 5, improving from initial presentation.  CTH + for acute left corona radiata acute CVA    # Acute left BG infract; s/p IV tpa; improvement of sx NIHSS 4    # Diffuse intracranial and vertebro-basilar atherosclerosis with multifocal stenosis.    - continue ASA 81 mg, add PLavix before D/C   - Statin 80 mg daily   - Aim for blood glucose goals less than 180.  - 2D echo   - PT eval - recommend rehab  - f/u with Dr. Mccloud as OP    D/W pt, her daughter and with Dr. Alcantara
63 F PMHx of DM, HTN, CVA (21 years ago, taken off coumadin 15 years ago) presented to the ED c/o difficulty speaking, aphasia, Right facial weakness, Dysarthric speech.  Left hemianopia on exam - is old from prior CVA. In ED,  NIHSS 5, improving from initial presentation.  CTH + for acute left corona radiata acute CVA    # Acute left BG infract; s/p IV tpa; improvement of sx NIHSS 4    # Diffuse intracranial and vertebro-basilar atherosclerosis with multifocal stenosis.    - continue ASA 81 mg, add PLavix today   - Statin 80 mg daily   - 2D echo with bubble study  - PT eval - recommend rehab  - f/u with Dr. Mccloud as OP    D/W pt, her daughter and with Dr. Garcia
63 F PMHx of DM, HTN, CVA (21 years ago, taken off coumadin 15 years ago) presented to the ED c/o difficulty speaking, aphasia, Right facial weakness, Dysarthric speech.  Left hemianopia on exam - is old from prior CVA. In ED,  NIHSS 5, improving from initial presentation.  CTH + for acute left corona radiata acute CVA    # Acute onset CVA, NIHSS 5, as per decision by neuro-on call and ED; IV tpa given because LKW was within tpa window. Pt noted with improvement of sx today, NIHSS 2    # Diffuse intracranial and vertebro-basilar atherosclerosis with multifocal stenosis.    - Start ASA today, plan to add PLavix   - Obtain MRI brain  - Statin 80 mg daily   - Aim for blood glucose goals less than 180.  - 2D echo with bubble study should be performed  - speech and swallow eval  - DVT prophylaxis  - PT eval  - Speech/swallow eval    D/W pt, her daughter and with Dr. Estrella
IMP:    65 y/o female with HTN, DM, HL, hypothyroidism, R CVA and TIA in Feb 2019--has loop recorder--no identify AFib admitted with L CVA and aphasia--no improvement thus far  R/O L arm DVT/hematoma    Plan:    Repeat HCT   US of L arm  PO diet  PT eval  Speech eval  OOB following 24 hrs post TPA  Resume ASA at 1900 if repeat HCT negative  DVT prophy--SCD  Neuro follow up    ICU care--d/w ICU staff
63F PMHx of DM, hypothyroidism, HTN, CVA (21 years ago, taken off ASA and coumadin 15 years ago) who presents to the ED c/o difficulty speaking, aphasia, Right facial weakness, Dysarthric speech.  Left hemianopia on exam.  Pt's last known well was about 1400 on 9/18/19, with improvement in speech and right upper extremity power.  Initially weaker in RUE and without speech. Pt with recent D/C from  on 2/14/19 after admission for aphasia and difficulty writing and elevated sbp 200's with suspected stroke type symptoms in the past.  CTH demonstrates acute stroke symptoms in the left corona radiata per preliminary report. CTA results officially pending but no obvious signs of LVO.    s/p CVA, s/p TPA with clinical improvement noted.  8/20- still with some difficulty speaking but no other focal deficits   Hypertensive CVA ? BP was 200/120 when daughter took it at the time of the event , r/o embolic CVA as well . LINQ interrogations in our office have not shown any AFIb as of yet .   1) cont asa/plavix for now   2) will order TTE with bubble study to look for PFO d/w neuro no need for ANGELITO as embolic event unlikely  3) continue Labetolol losraten amlodipine for BP control BP much better today   4) cont atorvastatin 80mg daily
64y old F with:  Acute CVA - s/p tPA  s/p ILR  HTN  DM  HL  hypothyroidism,  R CVA and TIA in Feb 2019  No AF     Plan:  Cont Close Monitoring  Serial Neuro Exams  CVA - s/p tPA  BP Stable  Cardiology Consult - Dr. Landrum  Will need TTE + Bubble  Second Antiplatelet per Neurology  No Acute Resp issues  PO diet  Monitor renal function  Strict I/O's  DVT prophylaxis - Hep SQ  Plan discussed with patient and wife at bedside with all questions answered in detail.

## 2019-08-27 DIAGNOSIS — Z79.82 LONG TERM (CURRENT) USE OF ASPIRIN: ICD-10-CM

## 2019-08-27 DIAGNOSIS — R47.01 APHASIA: ICD-10-CM

## 2019-08-27 DIAGNOSIS — R29.705 NIHSS SCORE 5: ICD-10-CM

## 2019-08-27 DIAGNOSIS — Z95.818 PRESENCE OF OTHER CARDIAC IMPLANTS AND GRAFTS: ICD-10-CM

## 2019-08-27 DIAGNOSIS — I63.59 CEREBRAL INFARCTION DUE TO UNSPECIFIED OCCLUSION OR STENOSIS OF OTHER CEREBRAL ARTERY: ICD-10-CM

## 2019-08-27 DIAGNOSIS — H53.47 HETERONYMOUS BILATERAL FIELD DEFECTS: ICD-10-CM

## 2019-08-27 DIAGNOSIS — R29.810 FACIAL WEAKNESS: ICD-10-CM

## 2019-08-27 DIAGNOSIS — E03.9 HYPOTHYROIDISM, UNSPECIFIED: ICD-10-CM

## 2019-08-27 DIAGNOSIS — R29.702 NIHSS SCORE 2: ICD-10-CM

## 2019-08-27 DIAGNOSIS — I10 ESSENTIAL (PRIMARY) HYPERTENSION: ICD-10-CM

## 2019-08-27 DIAGNOSIS — Z79.02 LONG TERM (CURRENT) USE OF ANTITHROMBOTICS/ANTIPLATELETS: ICD-10-CM

## 2019-08-27 DIAGNOSIS — I63.212 CEREBRAL INFARCTION DUE TO UNSPECIFIED OCCLUSION OR STENOSIS OF LEFT VERTEBRAL ARTERY: ICD-10-CM

## 2019-08-27 DIAGNOSIS — I63.22 CEREBRAL INFARCTION DUE TO UNSPECIFIED OCCLUSION OR STENOSIS OF BASILAR ARTERY: ICD-10-CM

## 2019-08-27 DIAGNOSIS — R47.1 DYSARTHRIA AND ANARTHRIA: ICD-10-CM

## 2019-08-27 DIAGNOSIS — E78.5 HYPERLIPIDEMIA, UNSPECIFIED: ICD-10-CM

## 2019-08-27 DIAGNOSIS — Z79.4 LONG TERM (CURRENT) USE OF INSULIN: ICD-10-CM

## 2019-08-27 DIAGNOSIS — E11.59 TYPE 2 DIABETES MELLITUS WITH OTHER CIRCULATORY COMPLICATIONS: ICD-10-CM

## 2019-08-27 DIAGNOSIS — I69.398 OTHER SEQUELAE OF CEREBRAL INFARCTION: ICD-10-CM

## 2019-09-01 ENCOUNTER — TRANSCRIPTION ENCOUNTER (OUTPATIENT)
Age: 64
End: 2019-09-01

## 2019-11-13 NOTE — DISCHARGE NOTE NURSING/CASE MANAGEMENT/SOCIAL WORK - NSDCPEPTCAREGIVEDUMATLIST _GEN_ALL_CORE
Known lung ca with new mets to brain  On keppra - transition to NGT  Mental status seems improved with keppra  Consult oncology - possibly rad/onc depending upon recs - new mets\  On 3L NC - wean as tolerated  sbp 100-180  Start CoReg  Rpt bmp - f/u potassium - istat 3.6  Transition keppra to NGT  Continue IVFs while   Nutrition recs puree verbal - f/u documentation  Transition to condom catheter       Stroke/Diabetes

## 2021-04-01 NOTE — ED ADULT NURSE NOTE - NS ED NURSE TRANSPORT WITH
----- Message from Og Umanzor MD sent at 3/31/2021  4:22 PM CDT -----  - Inform pt that his/her LDL was slightly elevated (100, goal <70), with normal HDL and triglycerides. He should talk with Dr. De Leon regarding considering starting cholesterol medication next visit. Educate on low fat diet (ex. avoid fried food, pork, butter, whole milk, etc.), exercise and loosing weight will help you to control these levels too.  - Urine was negative for proteins (this means, no kidney damage from diabetes).  - Normal chemistry, kidneys, liver.  
Attempted to call pt twice regarding recent lab results pt was unavailable and pt voicemail box was full unable to leave a voicemail.   
Cardiac Monitor/Defib/ACLS/Rescue Kit/O2/BVM

## 2022-08-24 NOTE — PATIENT PROFILE ADULT - NSSCCAGEALCOHOLCUTDOWN_GEN_A_NUR
Unsweetened vanilla almond milk   Cottage cheese and fruit  Protein shake like Premier protein, Adtkins look at the total carbohydrates         Goals:  Practice healthy stress management and mindful eating - think are you physically hungry or are you bored, stressed, emotional etc, make of list of things to do besides eat.    Try to get good quality sleep with a goal of 7-8 hours per night.  Stay physically active daily.  Recommend working up to a total of 30 minutes on 5 days/ week.  Recommend a fitness tracker.     Eat in a healthy way- eliminate trans fats, limit saturated fats and added sugars; follow the plate method - picture above.  Keep a food record (MyFitnessPal, Loseit).    A meal is 3 or more food groups; make it colorful for better nutrition.    Total Carbohydrates (in grams) = Breakfast  30    Lunch  30    Supper  30    If desired snacks 15                                       .       no

## 2023-01-05 NOTE — ASU PATIENT PROFILE, ADULT - HISTORY OF COVID-19 VACCINATION
Continuity of Care Form    Patient Name: Samantha Brumfield   :  1932  MRN:  [id number]    Admit date:  12/15/2020  Discharge date:  2020    Code Status Order: Full Code   Advance Directives:   [facility] Dequan Rodriguez              Admitting Physician:  Molina Motta MD  PCP: Griselda Trejo MD    Discharging Nurse: [facility] Unit/Room#: /-28  Discharging Unit Phone Number:[phone]     Emergency Contact:   Extended Emergency Contact Information  Primary Emergency Contact: Silvina [address] Phone: [phone]  Relation: Child   needed?  No  Secondary Emergency Contact: Leandra oHffman  Address: [address] [place] [address] Phone: [phone]  10/08/2015    Influenza Virus Vaccine 10/10/Porsha Overall) 10/08/2015 Pneumococcal Polysaccharide ([id number]) [id number]}  Bladder: {YES / [id number]}  154  Phone: [phone]  Fax [phone]    Dialysis Facility (if applicable)   Name:  Address:  Dialysis Schedule:  Phone:  Fax: Yes

## 2023-01-06 ENCOUNTER — OUTPATIENT (OUTPATIENT)
Dept: OUTPATIENT SERVICES | Facility: HOSPITAL | Age: 68
LOS: 1 days | Discharge: HOME | End: 2023-01-06
Payer: COMMERCIAL

## 2023-01-06 VITALS
DIASTOLIC BLOOD PRESSURE: 78 MMHG | OXYGEN SATURATION: 100 % | SYSTOLIC BLOOD PRESSURE: 154 MMHG | HEART RATE: 78 BPM | TEMPERATURE: 98 F | RESPIRATION RATE: 16 BRPM

## 2023-01-06 VITALS
DIASTOLIC BLOOD PRESSURE: 80 MMHG | WEIGHT: 177.03 LBS | OXYGEN SATURATION: 100 % | TEMPERATURE: 98 F | RESPIRATION RATE: 15 BRPM | HEART RATE: 83 BPM | SYSTOLIC BLOOD PRESSURE: 170 MMHG

## 2023-01-06 DIAGNOSIS — Z45.09 ENCOUNTER FOR ADJUSTMENT AND MANAGEMENT OF OTHER CARDIAC DEVICE: ICD-10-CM

## 2023-01-06 PROCEDURE — C1764: CPT

## 2023-01-06 PROCEDURE — 33286 RMVL SUBQ CAR RHYTHM MNTR: CPT | Mod: 59

## 2023-01-06 PROCEDURE — 33285 INSJ SUBQ CAR RHYTHM MNTR: CPT

## 2023-01-06 NOTE — PACU DISCHARGE NOTE - COMMENTS
pt tolerated well site no bleeding swelling noted Monitor paired with device by rep.  Education provided regarding home use.  Pt and/or family verbalizes understanding.  Device and ID card given to pt/family.

## 2023-01-06 NOTE — ASU PREOP CHECKLIST - ALLERGIES REVIEWED
Quality 431: Preventive Care And Screening: Unhealthy Alcohol Use - Screening: Patient identified as an unhealthy alcohol user when screened for unhealthy alcohol use using a systematic screening method and received brief counseling Detail Level: Detailed Quality 110: Preventive Care And Screening: Influenza Immunization: Influenza Immunization Administered during Influenza season Quality 402: Tobacco Use And Help With Quitting Among Adolescents: Patient screened for tobacco and never smoked done

## 2023-01-06 NOTE — PROCEDURE NOTE - NSICDXPROCEDURE_GEN_ALL_CORE_FT
Addended by: ÁNGEL LYONS on: 3/29/2019 08:53 AM     Modules accepted: Orders     PROCEDURES:  Insertion, loop recorder 06-Jan-2023 08:45:20  Ashley Morales  Removal, loop recorder 06-Jan-2023 08:45:32  Ashley Morales

## 2023-01-06 NOTE — PROCEDURAL SAFETY CHECKLIST WITH OR WITHOUT SEDATION - NSPOSTCOMMENTFT_GEN_ALL_CORE
Pt tolerated well site sutured dermabond applied by Dr Morales site  no bleeding swelling noted pt denies any discomfort will continue to monitor

## 2023-01-10 DIAGNOSIS — I63.81 OTHER CEREBRAL INFARCTION DUE TO OCCLUSION OR STENOSIS OF SMALL ARTERY: ICD-10-CM

## 2023-04-10 NOTE — ED ADULT NURSE NOTE - OBJECTIVE STATEMENT
Pt brought in by daughter for confusion, incoherent speech, weakness starting on Saturday or Sunday.  Pt unsure of history.  Pt and daughter state that pt takes DM and HTN meds intermittently, report history of CVA with peripheral vision loss in L eye.  EKG done on arrival.   Cardiac and VS monitoring initiated.  22g PIV placed in L wrist.
Full time

## 2023-04-24 NOTE — PROCEDURE NOTE - NSCOMPLICATION_GEN_A_CORE
Patient called back to the office, urine culture results reviewed, patient states that he is allergic to penicillin and is unsure if he has been able to take cephalosporins in the past.  Will start patient on Bactrim.  Patient to follow-up with PCP as needed or symptoms worsen or persist.  Patient verbalizes understanding.   no complications

## 2023-04-25 NOTE — PROCEDURAL SAFETY CHECKLIST WITH OR WITHOUT SEDATION - NSPREPROCEDSEDAT_GEN_ALL_CORE
Anesthesia Pre Eval Note    Anesthesia ROS/Med Hx        Anesthetic Complication History:  Patient does not have a history of anesthetic complications      Pulmonary Review:    The patient is a former smoker.     Neuro/Psych Review:  Patient does not have a neuro/psych history       Cardiovascular Review:    Positive for hyperlipidemia    GI/HEPATIC/RENAL Review:    Positive for GERD    End/Other Review:    Positive for obesity class I - 30.00 - 34.99  Positive for chronic pain      Relevant Problems   No relevant active problems       Physical Exam     Airway   Mallampati: II  TM Distance: >3 FB    Cardiovascular  Cardiovascular exam normal    Pulmonary Exam  Pulmonary exam normal    Abdominal Exam  Abdominal exam normal      Anesthesia Plan:    ASA Status: 2  Anesthesia Type: MAC    Checklist  Reviewed: Lab Results, Past Med History, Allergies, Anesthesia Record, Medications, Problem list and NPO Status  Consent/Risks Discussed Statement:  The proposed anesthetic plan, including its risks and benefits, have been discussed with the Patient along with the risks and benefits of alternatives. Questions were encouraged and answered and the patient and/or representative understands and agrees to proceed.        I discussed with the patient (and/or patient's legal representative) the risks and benefits of the proposed anesthesia plan, MAC, which may include services performed by other anesthesia providers.    Alternative anesthesia plans, if available, were reviewed with the patient (and/or patient's legal representative). Discussion has been held with the patient (and/or patient's legal representative) regarding risks of anesthesia, which include emergent situations that may require change in anesthesia plan.  The patient (and/or patient's legal representative) has indicated understanding, his/her questions have been answered, and he/she wishes to proceed with the planned anesthetic.       without sedation

## 2023-11-21 NOTE — PHYSICAL THERAPY INITIAL EVALUATION ADULT - FOLLOWS COMMANDS/ANSWERS QUESTIONS, REHAB EVAL
able to follow single-step instructions Banner Transposition Flap Text: The defect edges were debeveled with a #15 scalpel blade. Given the location of the defect and the proximity to free margins a Banner transposition flap was deemed most appropriate. Using a sterile surgical marker, an appropriate flap was drawn around the defect. The area thus outlined was incised deep to adipose tissue with a #15 scalpel blade. The skin margins were undermined to an appropriate distance in all directions utilizing iris scissors. Following this, the designed flap was carried into the primary defect and sutured into place.

## 2024-04-07 NOTE — ED ADULT NURSE NOTE - EXTENSIONS OF SELF_ADULT
Inpatient Radiology Pre-procedure Note    History of Present Illness:  Anette Ricci is a 59 y.o. female who presents for pelvic drain placement. Patient is s/p salpingo-oophorectomy and has developed likely abscess in surgical bed.    Admission H&P reviewed.  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    Hyperlipidemia     Hypertension     Malignant neoplasm of upper-outer quadrant of right breast in female, estrogen receptor positive 12/02/2015    Seizure disorder      Past Surgical History:   Procedure Laterality Date    BILATERAL SALPINGO-OOPHORECTOMY (BSO) N/A 3/25/2024    Procedure: SALPINGO-OOPHORECTOMY, BILATERAL;  Surgeon: Александр Washington MD;  Location: [facility] OR [address] [place] [address];  Service: OB/GYN;  Laterality: N/A;    BREAST BIOPSY      BREAST LUMPECTOMY Right 2013    CHOLECYSTECTOMY  3/25/2024    Procedure: CHOLECYSTECTOMY;  Surgeon: Bo Farmer MD;  Location: [facility] OR [address] [place] [address];  Service: General;;    DEBULKING OF TUMOR N/A 3/25/2024    Procedure: DEBULKING, NEOPLASM;  Surgeon: Александр Washington MD;  Location: [facility] OR [address] [place] [address];  Service: OB/GYN;  Laterality: N/A;    EXCISION, LIVER N/A 3/25/2024    Procedure: EXCISION, LIVER - partial;  Surgeon: Bo Farmer MD;  [facility];  Service: General;  Laterality: N/A;  bk ultrasound  [place] book by SAMINA on 3-21-24  7:00 a.m.  Conf #  [id number]    EYE SURGERY      LAPAROTOMY, EXPLORATORY N/A 3/25/2024    Procedure: LAPAROTOMY, EXPLORATORY;  Surgeon: Александр Washington MD;  Location: [facility] OR [address] [place] [address];  Service: OB/GYN;  Laterality: N/A;    OMENTECTOMY N/A 3/25/2024    Procedure: OMENTECTOMY;  Surgeon: Александр Washington MD;  [facility];  Service: OB/GYN;  Laterality: N/A;    PERITONEOCENTESIS N/A 3/13/2024    Procedure: PARACENTESIS, ABDOMINAL;  Surgeon: Flavia Moore MD;  Location: [facility] CATH LAB;  Service: Radiology;  Laterality: N/A;     PERITONEOCENTESIS N/A 3/21/2024    Procedure: PARACENTESIS, ABDOMINAL;  Surgeon: Jorge Jolley MD;  Location: [facility] CATH LAB;  Service: Radiology;  Laterality: N/A;    TOTAL ABDOMINAL HYSTERECTOMY N/A 3/25/2024    Procedure: HYSTERECTOMY, TOTAL, ABDOMINAL;  Surgeon: Александр Washington MD;  Location: [facility] OR [address] [place] [address];  Service: OB/GYN;  Laterality: N/A;    TOTAL REDUCTION MAMMOPLASTY Bilateral 2016       Review of Systems:   As documented in primary team H&P    Home Meds:   Prior to Admission medications    Medication Sig Start Date End Date Taking? Authorizing Provider   amlodipine (NORVASC) 10 MG tablet Take 10 mg by mouth once daily.  12/4/15   Provider, Historical   apixaban (ELIQUIS) 2.5 mg Tab Take 1 tablet (2.5 mg total) by mouth 2 (two) times daily. 3/30/24 4/27/24  Mel Boateng MD   atorvastatin (LIPITOR) 40 MG tablet Take 80 mg by mouth once daily.    Provider, Historical   enalapril (VASOTEC) 20 MG tablet Take 20 mg by mouth once daily.    Provider, Historical   fluticasone propionate (FLONASE) 50 mcg/actuation nasal spray 1 spray by Each Nostril route once daily.    Provider, Historical   hydrochlorothiazide (HYDRODIURIL) 25 MG tablet Take 25 mg by mouth once daily.  5/1/15   Provider, Historical   HYDROmorphone (DILAUDID) 2 MG tablet Take 1 tablet (2 mg total) by mouth every 8 (eight) hours as needed for Pain. 4/3/24 4/3/25  Mary Mccloud NP   ibuprofen (ADVIL,MOTRIN) 600 MG tablet Take 1 tablet (600 mg total) by mouth every 6 (six) hours as needed for Pain. 3/30/24   Mel Boateng MD   ibuprofen (ADVIL,MOTRIN) 800 MG tablet Take 800 mg by mouth every 6 (six) hours as needed for Pain. 2/27/24   Provider, Historical   insulin detemir U-100, Levemir, (LEVEMIR FLEXPEN) 100 unit/mL (3 mL) InPn pen Inject 5 Units into the skin every evening. 3/30/24 3/30/25  Mel Boateng MD   insulin lispro (HUMALOG U-100 INSULIN) 100 unit/mL injection **LOW CORRECTION DOSE**  Blood  Glucose  mg/dL                  Pre-meal                  151-200                0 unit                        201-250                2 units                      251-300                3 units                      301-350                4 units                      >350                     5 units                      Administer subcutaneously if needed at times designated by monitoring schedule. 3/30/24   Mel Boateng MD   ketorolac 0.5% (ACULAR) 0.5 % Drop Place 1 drop into both eyes. For pain after eye injections.    Provider, Historical   lamoTRIgine (LAMICTAL) 25 MG tablet Take 25 mg by mouth 2 (two) times daily.    Provider, Historical   lorazepam (ATIVAN) 0.5 MG tablet Take 0.5 mg by mouth every 12 (twelve) hours as needed for Anxiety. 5/31/14   Provider, Historical   metformin (GLUCOPHAGE) 1000 MG tablet Take 1,000 mg by mouth daily with breakfast.     Provider, Historical   ondansetron (ZOFRAN-ODT) 4 MG TbDL Take 1 tablet (4 mg total) by mouth every 6 (six) hours as needed (for nausea). 3/31/24   Mel Boateng MD   quetiapine (SEROQUEL) 25 MG Tab Take 25 mg by mouth daily as needed. 2/4/16   Provider, Historical   tirzepatide (MOUNJARO) 5 mg/0.5 mL PnIj Inject 5 mg into the skin every 7 days. On Sunday.    Provider, Historical     Scheduled Meds:    atorvastatin  80 mg Oral Daily    ciprofloxacin  400 mg Intravenous Q12H    insulin detemir U-100  5 Units Subcutaneous Daily    lamoTRIgine  25 mg Oral BID    metronidazole  500 mg Intravenous Q8H    senna-docusate 8.6-50 mg  1 tablet Oral BID     Continuous Infusions:   PRN Meds:0.9%  NaCl infusion (for blood administration), acetaminophen, albuterol, benzonatate, dextrose 10%, dextrose 10%, glucagon (human recombinant), hydrALAZINE, ibuprofen, insulin aspart U-100, LORazepam, magnesium hydroxide 400 mg/5 ml, ondansetron, oxyCODONE, oxyCODONE, prochlorperazine, sodium chloride 0.9%  Anticoagulants/Antiplatelets: no anticoagulation    Allergies:  "  Review of patient's allergies indicates:   Allergen Reactions    Codeine Other (See Comments)     Flu like s/s    Tramadol Other (See Comments)     Flu like symptoms similar to codeine reaction     Sedation Hx: have not been any systemic reactions    Labs:  No results for input(s): "INR", "PT", "PTT" in the last 168 hours.    Recent Labs   Lab 04/06/24 0424   WBC 20.28*   HGB 8.7*   HCT 27.2*   MCV 81*   *      Recent Labs   Lab 04/05/24 0746 04/06/24 0424   * 382*   * 131*   K 2.9* 3.0*   CL 92* 88*   CO2 34* 32*   BUN 7 6   CREATININE 0.6 0.6   CALCIUM 7.2* 7.3*   ALT 12  --    AST 20  --    ALBUMIN 1.1*  --    BILITOT 0.3  --          Vitals:  Temp: 99.1 °F (37.3 °C) (04/07/24 0743)  Pulse: 93 (04/07/24 0743)  Resp: 18 (04/07/24 0743)  BP: 117/73 (04/07/24 0743)  SpO2: 96 % (04/07/24 0743)     Physical Exam:  ASA: 2  Mallampati: 2    General: no acute distress  Mental Status: alert and oriented to person, place and time  HEENT: normocephalic, atraumatic  Chest: unlabored breathing  Heart: regular heart rate  Abdomen: bandage overlying midline with some tendernes to palpation.  Extremity: moves all extremities    Plan: drain placement  Sedation Plan: anibal Malave MD ([place])  Interventional Radiology          " None

## 2024-04-17 NOTE — PATIENT PROFILE ADULT - NSASFALLATTEMPTOOB_GEN_A_NUR
"Shift summary ([id number])    Pt disoriented to time/situation. Demanding of cares, repeated requests. VSS on 2L O2 per NC. Denies pain, CP, SOB. Lost PIV access, MD verbal ok for pt to be w/o IV access. Up to chair and bathroom Ax1 GB W. PRN imodium given this shift w/ MD ok for multiple loose stools; pt afebrile and denies abd pain, no stool sample needed at this time per MD. Changed to PO abx. Pt endorsed to MD that he refuses BiPAP d/t facial itching but is agreeable to try zyrtec at bedtime for symptom management. Discharge TBD.     Goal Outcome Evaluation:      Plan of Care Reviewed With: patient    Overall Patient Progress: improvingOverall Patient Progress: improving    Outcome Evaluation: O2 sats stable on 2L O2 per NC. Pt disoriented to month but alert and interactive. Transitioned to PO abx.      Problem: Adult Inpatient Plan of Care  Goal: Plan of Care Review  Description: The Plan of Care Review/Shift note should be completed every shift.  The Outcome Evaluation is a brief statement about your assessment that the patient is improving, declining, or no change.  This information will be displayed automatically on your shift  note.  Outcome: Progressing  Flowsheets (Taken 4/17/2024 1417)  Outcome Evaluation: O2 sats stable on 2L O2 per NC. Pt disoriented to month but alert and interactive. Transitioned to PO abx.  Plan of Care Reviewed With: patient  Overall Patient Progress: improving  Goal: Patient-Specific Goal (Individualized)  Description: You can add care plan individualizations to a care plan. Examples of Individualization might be:  \"Parent requests to be called daily at 9am for status\", \"I have a hard time hearing out of my right ear\", or \"Do not touch me to wake me up as it startles  me\".  Outcome: Progressing  Goal: Absence of Hospital-Acquired Illness or Injury  Outcome: Progressing  Intervention: Identify and Manage Fall Risk  Recent Flowsheet Documentation  Taken 4/17/2024 1106 by Scotty, " Lora E., RN  Safety Promotion/Fall Prevention: safety round/check completed  Taken 4/17/2024 0924 by Lora Fajardo RN  Safety Promotion/Fall Prevention:   activity supervised   assistive device/personal items within reach   clutter free environment maintained   increased rounding and observation   increase visualization of patient   lighting adjusted   mobility aid in reach   nonskid shoes/slippers when out of bed   patient and family education   room door open   room near nurse's station   room organization consistent   safety round/check completed   supervised activity   treat reversible contributory factors   treat underlying cause  Taken 4/17/2024 0712 by Lora Fajardo RN  Safety Promotion/Fall Prevention: safety round/check completed  Intervention: Prevent Skin Injury  Recent Flowsheet Documentation  Taken 4/17/2024 1218 by Lora Fajardo RN  Body Position:   weight shifting   turned   side-lying   left   heels elevated  Taken 4/17/2024 0924 by Lora Fajardo RN  Skin Protection:   adhesive use limited   incontinence pads utilized   pulse oximeter probe site changed  Device Skin Pressure Protection:   absorbent pad utilized/changed   adhesive use limited   pressure points protected   tubing/devices free from skin contact  Intervention: Prevent and Manage VTE (Venous Thromboembolism) Risk  Recent Flowsheet Documentation  Taken 4/17/2024 0924 by Lora Fajardo RN  VTE Prevention/Management: (SQ lovenox) other (see comments)  Intervention: Prevent Infection  Recent Flowsheet Documentation  Taken 4/17/2024 0924 by Lora Fajardo RN  Infection Prevention:   equipment surfaces disinfected   hand hygiene promoted   personal protective equipment utilized   rest/sleep promoted   single patient room provided  Goal: Optimal Comfort and Wellbeing  Outcome: Progressing  Goal: Readiness for Transition of Care  Outcome: Progressing      no

## 2024-09-26 NOTE — PROVIDER CONTACT NOTE (OTHER) - NAME OF MD/NP/PA/DO NOTIFIED:
Dr. Israel Render Post-Care Instructions In Note?: no Consent: The patient's consent was obtained including but not limited to risks of crusting, scabbing, blistering, scarring, darker or lighter pigmentary change, recurrence, incomplete removal and infection. Show Applicator Variable?: Yes Duration Of Freeze Thaw-Cycle (Seconds): 10 Application Tool (Optional): Cry-AC Detail Level: Detailed Number Of Freeze-Thaw Cycles: 1 freeze-thaw cycle Post-Care Instructions: I reviewed with the patient in detail post-care instructions. Patient is to wear sunprotection, and avoid picking at any of the treated lesions. Pt may apply Vaseline to crusted or scabbing areas.

## 2024-12-11 NOTE — PROGRESS NOTE ADULT - SUBJECTIVE AND OBJECTIVE BOX
HPI: *** PATIENT NOT YET SEEN***    ROS: 12 point ROS negative other than discussed above in HPI    MEDICATIONS  (STANDING):  amLODIPine   Tablet 10 milliGRAM(s) Oral daily  aspirin enteric coated 81 milliGRAM(s) Oral daily  atorvastatin 40 milliGRAM(s) Oral at bedtime  clopidogrel Tablet 75 milliGRAM(s) Oral daily  heparin  Injectable 5000 Unit(s) SubCutaneous every 8 hours  influenza   Vaccine 0.5 milliLiter(s) IntraMuscular once  insulin glargine Injectable (LANTUS) 12 Unit(s) SubCutaneous every morning  insulin lispro (HumaLOG) corrective regimen sliding scale   SubCutaneous three times a day before meals  labetalol 200 milliGRAM(s) Oral two times a day  levothyroxine 75 MICROGram(s) Oral daily  losartan 100 milliGRAM(s) Oral daily      Vital Signs Last 24 Hrs  T(C): 37.1 (13 Feb 2019 05:10), Max: 37.1 (12 Feb 2019 10:27)  T(F): 98.8 (13 Feb 2019 05:10), Max: 98.8 (12 Feb 2019 10:27)  HR: 87 (13 Feb 2019 05:10) (81 - 95)  BP: 141/78 (13 Feb 2019 05:10) (107/57 - 141/78)  BP(mean): --  RR: 16 (13 Feb 2019 05:10) (16 - 18)  SpO2: 100% (13 Feb 2019 05:10) (100% - 100%)    EXAM:  Constitutional: awake and alert.  HEENT: PERRLA, EOMI,   Neck: Supple.  Cardiovascular: S1 and S2, regular rhythm  Extremities:  no edema      Neurological exam:  HF: Alert oriented, fluent speech intact attention   CN: VAL, EOMI, LEFT hemifield cut (OLD); facial sensation normal, no NLFD, tongue midline, Palate moves equally, SCM equal bilaterally  Motor: No pronator drift, Strength 5/5 in all 4 ext, normal bulk and tone, no tremor, rigidity or bradykinesia.    Sens: Intact to light touch, vibration   Reflexes: 1+ in arms absent in both legs (achilles, and patellar)  Coord: FNF normal and symmetric   Gait/Balance: declined                        14.8   7.58  )-----------( 136      ( 11 Feb 2019 17:38 )             43.8     02-11    136  |  99  |  19  ----------------------------<  312<H>  3.8   |  28  |  1.23    Ca    9.5      11 Feb 2019 17:38    TPro  8.4<H>  /  Alb  3.7  /  TBili  0.7  /  DBili  x   /  AST  33  /  ALT  34  /  AlkPhos  134<H>  02-11 02-12 [id number] 15.4    02-12 Chol 246<H> <H> HDL 40<L> Trig 229<H>    Radiology report:  - CT Head 2/11/19: old right PCA territory infarct along with mild [place] vessel ischemic changes throughout the white matter of both hemispheres. No acute abnormality suggested. Lacunar infarcts also noted in the brainstem.  - MRI brain 2/12/19: punctate left posterior frontal stroke   - MRA head/neck 2/12/19: Moderate multifocal areas of stenosis right PCA P3 segment. Moderate stenosis in the distal vertebral artery just proximal to the vertebrobasilar junction. Mild stenosis in the left MCA bifurcation. The [place] and vertebrobasilar system shows no other evidence of significant stenosis, occlusion or saccular aneurysm dilation. No evidence for arterial venous malformation. The vertebral arteries are codominant. No significant stenosis in the neck   - TTE pending     A/P: this is a 63 year old woman with history of stroke (R PCA), poorly controlled DM (a1c 15.4), HLD (), HTN presenting with two episodes of aphasia, found to have only very small left posterior frontal infarct. Patient has intracranial atherosclerosis which may be leading to her strokes however atrial fibrillation is not yet ruled out.     - TTE is pending  - Long term cardiac monitor for afib   - increase atorvastatin to 80mg - goal LDL is under 70  - Diabetes control   - BP to normotensive   - Aspirin 81mg   - she needs to be set up with good primary care doctor  - when ready for discharge she can see me in clinic for neurology follow up HPI:   Patient feels well today  no complaints. no headache numbness tingling weakness vision change trouble speaking or swallowing   She is walking and out of bed   Discussed MRI and lab tests with her and emphasized stroke risk factor reduction     ROS: 12 point ROS negative other than discussed above in HPI    MEDICATIONS  (STANDING):  amLODIPine   Tablet 10 milliGRAM(s) Oral daily  aspirin enteric coated 81 milliGRAM(s) Oral daily  atorvastatin 40 milliGRAM(s) Oral at bedtime  clopidogrel Tablet 75 milliGRAM(s) Oral daily  heparin  Injectable 5000 Unit(s) SubCutaneous every 8 hours  influenza   Vaccine 0.5 milliLiter(s) IntraMuscular once  insulin glargine Injectable (LANTUS) 12 Unit(s) SubCutaneous every morning  insulin lispro (HumaLOG) corrective regimen sliding scale   SubCutaneous three times a day before meals  labetalol 200 milliGRAM(s) Oral two times a day  levothyroxine 75 MICROGram(s) Oral daily  losartan 100 milliGRAM(s) Oral daily      Vital Signs Last 24 Hrs  T(C): 37.1 (13 Feb 2019 05:10), Max: 37.1 (12 Feb 2019 10:27)  T(F): 98.8 (13 Feb 2019 05:10), Max: 98.8 (12 Feb 2019 10:27)  HR: 87 (13 Feb 2019 05:10) (81 - 95)  BP: 141/78 (13 Feb 2019 05:10) (107/57 - 141/78)  BP(mean): --  RR: 16 (13 Feb 2019 05:10) (16 - 18)  SpO2: 100% (13 Feb 2019 05:10) (100% - 100%)    EXAM:  Constitutional: awake and alert.  HEENT: PERRLA, EOMI,   Neck: Supple.  Cardiovascular: S1 and S2, regular rhythm  Extremities:  no edema      Neurological exam:  HF: Alert oriented, fluent speech intact attention   CN: VAL, EOMI, LEFT hemifield cut (OLD); facial sensation normal, no NLFD, tongue midline, Palate moves equally, SCM equal bilaterally  Motor: No pronator drift, Strength 5/5 in all 4 ext, normal bulk and tone, no tremor, rigidity or bradykinesia.    Sens: Intact to light touch, vibration   Reflexes: 1+ in arms absent in both legs (achilles, and patellar)  Coord: FNF normal and symmetric   Gait/Balance: declined                        14.8   7.58  )-----------( 136      ( 11 Feb 2019 17:38 )             43.8     02-11    136  |  99  |  19  ----------------------------<  312<H>  3.8   |  28  |  1.23    Ca    9.5      11 Feb 2019 17:38    TPro  8.4<H>  /  Alb  3.7  /  TBili  0.7  /  DBili  x   /  AST  33  /  ALT  34  /  AlkPhos  134<H>  02-11 02-12 [id number] 15.4    02-12 Chol 246<H> <H> HDL 40<L> Trig 229<H>    Radiology report:  - CT Head 2/11/19: old right PCA territory infarct along with mild [place] vessel ischemic changes throughout the white matter of both hemispheres. No acute abnormality suggested. Lacunar infarcts also noted in the brainstem.  - MRI brain 2/12/19: punctate left posterior frontal stroke   - MRA head/neck 2/12/19: Moderate multifocal areas of stenosis right PCA P3 segment. Moderate stenosis in the distal vertebral artery just proximal to the vertebrobasilar junction. Mild stenosis in the left MCA bifurcation. The [place] and vertebrobasilar system shows no other evidence of significant stenosis, occlusion or saccular aneurysm dilation. No evidence for arterial venous malformation. The vertebral arteries are codominant. No significant stenosis in the neck   - TTE pending     A/P: this is a 63 year old woman with history of stroke (R PCA), poorly controlled DM (a1c 15.4), HLD (), HTN presenting with two episodes of aphasia, found to have only very small left posterior frontal infarct. Patient has intracranial atherosclerosis which may be leading to her strokes however atrial fibrillation is not yet ruled out.     Discussed MRI and lab tests with her and emphasized stroke risk factor reduction (HLD, HTN, DM)     - TTE is pending  - Long term cardiac monitor for afib   - increase atorvastatin to 80mg - goal LDL is under 70  - Diabetes control   - BP to normotensive   - Aspirin 81mg   - she has PCP who she will follow with  - when ready for discharge she can see me in clinic for neurology follow up - S/p ablation and loop implantation by EP 12/10/24  - EP recommending DC amiodarone  - c/w toprol 12.5 BID  - C/w eliquis HPI:   Patient feels well today  no complaints. no headache numbness tingling weakness vision change trouble speaking or swallowing   She is walking and out of bed   Discussed MRI and lab tests with her and emphasized stroke risk factor reduction     ROS: 12 point ROS negative other than discussed above in HPI    MEDICATIONS  (STANDING):  amLODIPine   Tablet 10 milliGRAM(s) Oral daily  aspirin enteric coated 81 milliGRAM(s) Oral daily  atorvastatin 40 milliGRAM(s) Oral at bedtime  clopidogrel Tablet 75 milliGRAM(s) Oral daily  heparin  Injectable 5000 Unit(s) SubCutaneous every 8 hours  influenza   Vaccine 0.5 milliLiter(s) IntraMuscular once  insulin glargine Injectable (LANTUS) 12 Unit(s) SubCutaneous every morning  insulin lispro (HumaLOG) corrective regimen sliding scale   SubCutaneous three times a day before meals  labetalol 200 milliGRAM(s) Oral two times a day  levothyroxine 75 MICROGram(s) Oral daily  losartan 100 milliGRAM(s) Oral daily      Vital Signs Last 24 Hrs  T(C): 37.1 (13 Feb 2019 05:10), Max: 37.1 (12 Feb 2019 10:27)  T(F): 98.8 (13 Feb 2019 05:10), Max: 98.8 (12 Feb 2019 10:27)  HR: 87 (13 Feb 2019 05:10) (81 - 95)  BP: 141/78 (13 Feb 2019 05:10) (107/57 - 141/78)  BP(mean): --  RR: 16 (13 Feb 2019 05:10) (16 - 18)  SpO2: 100% (13 Feb 2019 05:10) (100% - 100%)    EXAM:  Constitutional: awake and alert.  HEENT: PERRLA, EOMI,   Neck: Supple.  Cardiovascular: S1 and S2, regular rhythm  Extremities:  no edema      Neurological exam:  HF: Alert oriented, fluent speech intact attention   CN: VAL, EOMI, LEFT hemifield cut (OLD); facial sensation normal, no NLFD, tongue midline, Palate moves equally, SCM equal bilaterally  Motor: No pronator drift, Strength 5/5 in all 4 ext, normal bulk and tone, no tremor, rigidity or bradykinesia.    Sens: Intact to light touch, vibration   Reflexes: 1+ in arms absent in both legs (achilles, and patellar)  Coord: FNF normal and symmetric   Gait/Balance: declined                        14.8   7.58  )-----------( 136      ( 11 Feb 2019 17:38 )             43.8     02-11    136  |  99  |  19  ----------------------------<  312<H>  3.8   |  28  |  1.23    Ca    9.5      11 Feb 2019 17:38    TPro  8.4<H>  /  Alb  3.7  /  TBili  0.7  /  DBili  x   /  AST  33  /  ALT  34  /  AlkPhos  134<H>  02-11 02-12 [id number] 15.4    02-12 Chol 246<H> <H> HDL 40<L> Trig 229<H>    Radiology report:  - CT Head 2/11/19: old right PCA territory infarct along with mild [place] vessel ischemic changes throughout the white matter of both hemispheres. No acute abnormality suggested. Lacunar infarcts also noted in the brainstem.  - MRI brain 2/12/19: punctate left posterior frontal stroke   - MRA head/neck 2/12/19: Moderate multifocal areas of stenosis right PCA P3 segment. Moderate stenosis in the distal vertebral artery just proximal to the vertebrobasilar junction. Mild stenosis in the left MCA bifurcation. The [place] and vertebrobasilar system shows no other evidence of significant stenosis, occlusion or saccular aneurysm dilation. No evidence for arterial venous malformation. The vertebral arteries are codominant. No significant stenosis in the neck   - TTE pending     A/P: this is a 63 year old woman with history of stroke (R PCA), poorly controlled DM (a1c 15.4), HLD (), HTN presenting with two episodes of aphasia, found to have only very small left posterior frontal infarct. Patient has intracranial atherosclerosis which may be leading to her strokes however atrial fibrillation is not yet ruled out.     Discussed MRI and lab tests with her and emphasized stroke risk factor reduction (HLD, HTN, DM)     - increase atorvastatin to 80mg (from 40mg) - goal LDL is under 70  - Diabetes control   - BP to normotensive   - Cont Aspirin 81mg & Plavix 75mg  - Awaiting TTE and LINQ  - she has PCP who she will follow with  - when ready for discharge she can see me in clinic for neurology follow up - holding metoprolol as above  - c/w amiodarone 200mg qd  - continue Eliquis - EP planning CTI ablation tomorrow. Amiodarone on hold per Cardiology, c/w toprol 12.5 BID  - C/w eliquis - EP planning CTI ablation today. Amiodarone on hold per Cardiology, c/w toprol 12.5 BID  - C/w eliquis

## 2025-01-02 ENCOUNTER — NON-APPOINTMENT (OUTPATIENT)
Age: 70
End: 2025-01-02

## 2025-01-02 ENCOUNTER — INPATIENT (INPATIENT)
Facility: HOSPITAL | Age: 70
LOS: 1 days | Discharge: HOME | DRG: 948 | End: 2025-01-04
Attending: STUDENT IN AN ORGANIZED HEALTH CARE EDUCATION/TRAINING PROGRAM | Admitting: STUDENT IN AN ORGANIZED HEALTH CARE EDUCATION/TRAINING PROGRAM
Payer: COMMERCIAL

## 2025-01-02 VITALS
HEIGHT: 66 IN | DIASTOLIC BLOOD PRESSURE: 87 MMHG | TEMPERATURE: 100 F | HEART RATE: 119 BPM | RESPIRATION RATE: 18 BRPM | SYSTOLIC BLOOD PRESSURE: 160 MMHG | WEIGHT: 160.06 LBS | OXYGEN SATURATION: 97 %

## 2025-01-02 DIAGNOSIS — R41.82 ALTERED MENTAL STATUS, UNSPECIFIED: ICD-10-CM

## 2025-01-02 LAB
ACANTHOCYTES BLD QL SMEAR: SLIGHT — SIGNIFICANT CHANGE UP
ALBUMIN SERPL ELPH-MCNC: 3.3 G/DL — SIGNIFICANT CHANGE UP (ref 3.3–5)
ALP SERPL-CCNC: 114 U/L — SIGNIFICANT CHANGE UP (ref 40–120)
ALT FLD-CCNC: 24 U/L — SIGNIFICANT CHANGE UP (ref 12–78)
AMMONIA BLD-MCNC: <10 UMOL/L — LOW (ref 11–32)
ANION GAP SERPL CALC-SCNC: 6 MMOL/L — SIGNIFICANT CHANGE UP (ref 5–17)
APPEARANCE UR: CLEAR — SIGNIFICANT CHANGE UP
APTT BLD: 27.7 SEC — SIGNIFICANT CHANGE UP (ref 24.5–35.6)
AST SERPL-CCNC: 35 U/L — SIGNIFICANT CHANGE UP (ref 15–37)
BACTERIA # UR AUTO: NEGATIVE /HPF — SIGNIFICANT CHANGE UP
BASOPHILS # BLD AUTO: 0.02 K/UL — SIGNIFICANT CHANGE UP (ref 0–0.2)
BASOPHILS NFR BLD AUTO: 0.3 % — SIGNIFICANT CHANGE UP (ref 0–2)
BILIRUB SERPL-MCNC: 1 MG/DL — SIGNIFICANT CHANGE UP (ref 0.2–1.2)
BILIRUB UR-MCNC: NEGATIVE — SIGNIFICANT CHANGE UP
BUN SERPL-MCNC: 26 MG/DL — HIGH (ref 7–23)
CALCIUM SERPL-MCNC: 9.1 MG/DL — SIGNIFICANT CHANGE UP (ref 8.5–10.1)
CAST: 4 /LPF — SIGNIFICANT CHANGE UP (ref 0–4)
CHLORIDE SERPL-SCNC: 104 MMOL/L — SIGNIFICANT CHANGE UP (ref 96–108)
CO2 SERPL-SCNC: 24 MMOL/L — SIGNIFICANT CHANGE UP (ref 22–31)
COLOR SPEC: SIGNIFICANT CHANGE UP
CREAT SERPL-MCNC: 1.29 MG/DL — SIGNIFICANT CHANGE UP (ref 0.5–1.3)
DIFF PNL FLD: NEGATIVE — SIGNIFICANT CHANGE UP
EGFR: 45 ML/MIN/1.73M2 — LOW
EGFR: 45 ML/MIN/1.73M2 — LOW
EOSINOPHIL # BLD AUTO: 0.02 K/UL — SIGNIFICANT CHANGE UP (ref 0–0.5)
EOSINOPHIL NFR BLD AUTO: 0.3 % — SIGNIFICANT CHANGE UP (ref 0–6)
FLUAV AG NPH QL: DETECTED
FLUBV AG NPH QL: SIGNIFICANT CHANGE UP
GLUCOSE BLDC GLUCOMTR-MCNC: 125 MG/DL — HIGH (ref 70–99)
GLUCOSE BLDC GLUCOMTR-MCNC: 150 MG/DL — HIGH (ref 70–99)
GLUCOSE SERPL-MCNC: 167 MG/DL — HIGH (ref 70–99)
GLUCOSE UR QL: NEGATIVE MG/DL — SIGNIFICANT CHANGE UP
HCT VFR BLD CALC: 36.5 % — SIGNIFICANT CHANGE UP (ref 34.5–45)
HGB BLD-MCNC: 12 G/DL — SIGNIFICANT CHANGE UP (ref 11.5–15.5)
IMM GRANULOCYTES NFR BLD AUTO: 0.4 % — SIGNIFICANT CHANGE UP (ref 0–0.9)
INR BLD: 0.99 RATIO — SIGNIFICANT CHANGE UP (ref 0.85–1.16)
KETONES UR-MCNC: ABNORMAL MG/DL
LACTATE SERPL-SCNC: 1 MMOL/L — SIGNIFICANT CHANGE UP (ref 0.7–2)
LEUKOCYTE ESTERASE UR-ACNC: ABNORMAL
LYMPHOCYTES # BLD AUTO: 0.48 K/UL — LOW (ref 1–3.3)
LYMPHOCYTES # BLD AUTO: 6.6 % — LOW (ref 13–44)
MANUAL SMEAR VERIFICATION: SIGNIFICANT CHANGE UP
MCHC RBC-ENTMCNC: 28.8 PG — SIGNIFICANT CHANGE UP (ref 27–34)
MCHC RBC-ENTMCNC: 32.9 G/DL — SIGNIFICANT CHANGE UP (ref 32–36)
MCV RBC AUTO: 87.5 FL — SIGNIFICANT CHANGE UP (ref 80–100)
MONOCYTES # BLD AUTO: 0.42 K/UL — SIGNIFICANT CHANGE UP (ref 0–0.9)
MONOCYTES NFR BLD AUTO: 5.8 % — SIGNIFICANT CHANGE UP (ref 2–14)
NEUTROPHILS # BLD AUTO: 6.29 K/UL — SIGNIFICANT CHANGE UP (ref 1.8–7.4)
NEUTROPHILS NFR BLD AUTO: 86.6 % — HIGH (ref 43–77)
NITRITE UR-MCNC: NEGATIVE — SIGNIFICANT CHANGE UP
OVALOCYTES BLD QL SMEAR: SLIGHT — SIGNIFICANT CHANGE UP
PH UR: 5.5 — SIGNIFICANT CHANGE UP (ref 5–8)
PLAT MORPH BLD: NORMAL — SIGNIFICANT CHANGE UP
PLATELET # BLD AUTO: 127 K/UL — LOW (ref 150–400)
POIKILOCYTOSIS BLD QL AUTO: SLIGHT — SIGNIFICANT CHANGE UP
POTASSIUM SERPL-MCNC: 4 MMOL/L — SIGNIFICANT CHANGE UP (ref 3.5–5.3)
POTASSIUM SERPL-SCNC: 4 MMOL/L — SIGNIFICANT CHANGE UP (ref 3.5–5.3)
PROT SERPL-MCNC: 8 GM/DL — SIGNIFICANT CHANGE UP (ref 6–8.3)
PROT UR-MCNC: 300 MG/DL
PROTHROM AB SERPL-ACNC: 11.4 SEC — SIGNIFICANT CHANGE UP (ref 9.9–13.4)
RBC # BLD: 4.17 M/UL — SIGNIFICANT CHANGE UP (ref 3.8–5.2)
RBC # FLD: 13.1 % — SIGNIFICANT CHANGE UP (ref 10.3–14.5)
RBC BLD AUTO: ABNORMAL
RBC CASTS # UR COMP ASSIST: 4 /HPF — SIGNIFICANT CHANGE UP (ref 0–4)
RSV RNA NPH QL NAA+NON-PROBE: SIGNIFICANT CHANGE UP
SARS-COV-2 RNA SPEC QL NAA+PROBE: SIGNIFICANT CHANGE UP
SODIUM SERPL-SCNC: 134 MMOL/L — LOW (ref 135–145)
SP GR SPEC: >1.03 — HIGH (ref 1–1.03)
SQUAMOUS # UR AUTO: 8 /HPF — HIGH (ref 0–5)
TROPONIN I, HIGH SENSITIVITY RESULT: 24.58 NG/L — SIGNIFICANT CHANGE UP
UROBILINOGEN FLD QL: 1 MG/DL — SIGNIFICANT CHANGE UP (ref 0.2–1)
WBC # BLD: 7.26 K/UL — SIGNIFICANT CHANGE UP (ref 3.8–10.5)
WBC # FLD AUTO: 7.26 K/UL — SIGNIFICANT CHANGE UP (ref 3.8–10.5)
WBC UR QL: 6 /HPF — HIGH (ref 0–5)

## 2025-01-02 PROCEDURE — 70498 CT ANGIOGRAPHY NECK: CPT | Mod: 26,MC

## 2025-01-02 PROCEDURE — 99223 1ST HOSP IP/OBS HIGH 75: CPT

## 2025-01-02 PROCEDURE — 70551 MRI BRAIN STEM W/O DYE: CPT | Mod: MC

## 2025-01-02 PROCEDURE — 87040 BLOOD CULTURE FOR BACTERIA: CPT

## 2025-01-02 PROCEDURE — 83036 HEMOGLOBIN GLYCOSYLATED A1C: CPT

## 2025-01-02 PROCEDURE — 84443 ASSAY THYROID STIM HORMONE: CPT

## 2025-01-02 PROCEDURE — 85027 COMPLETE CBC AUTOMATED: CPT

## 2025-01-02 PROCEDURE — 99285 EMERGENCY DEPT VISIT HI MDM: CPT

## 2025-01-02 PROCEDURE — 82962 GLUCOSE BLOOD TEST: CPT

## 2025-01-02 PROCEDURE — 99291 CRITICAL CARE FIRST HOUR: CPT

## 2025-01-02 PROCEDURE — 70496 CT ANGIOGRAPHY HEAD: CPT | Mod: 26,MC

## 2025-01-02 PROCEDURE — 93306 TTE W/DOPPLER COMPLETE: CPT

## 2025-01-02 PROCEDURE — 71250 CT THORAX DX C-: CPT | Mod: 26

## 2025-01-02 PROCEDURE — 80061 LIPID PANEL: CPT

## 2025-01-02 PROCEDURE — 36415 COLL VENOUS BLD VENIPUNCTURE: CPT

## 2025-01-02 PROCEDURE — 71250 CT THORAX DX C-: CPT | Mod: MC

## 2025-01-02 PROCEDURE — 71045 X-RAY EXAM CHEST 1 VIEW: CPT | Mod: 26

## 2025-01-02 PROCEDURE — 80048 BASIC METABOLIC PNL TOTAL CA: CPT

## 2025-01-02 PROCEDURE — 93010 ELECTROCARDIOGRAM REPORT: CPT

## 2025-01-02 PROCEDURE — 0042T: CPT | Mod: MC

## 2025-01-02 PROCEDURE — 70450 CT HEAD/BRAIN W/O DYE: CPT | Mod: 26,59,MC

## 2025-01-02 RX ORDER — OSELTAMIVIR PHOSPHATE 75 MG/1
75 CAPSULE ORAL ONCE
Refills: 0 | Status: COMPLETED | OUTPATIENT
Start: 2025-01-02 | End: 2025-01-02

## 2025-01-02 RX ORDER — ACETAMINOPHEN 500 MG/5ML
650 LIQUID (ML) ORAL EVERY 6 HOURS
Refills: 0 | Status: DISCONTINUED | OUTPATIENT
Start: 2025-01-02 | End: 2025-01-04

## 2025-01-02 RX ORDER — INSULIN LISPRO 100 U/ML
INJECTION, SOLUTION INTRAVENOUS; SUBCUTANEOUS
Refills: 0 | Status: DISCONTINUED | OUTPATIENT
Start: 2025-01-02 | End: 2025-01-04

## 2025-01-02 RX ORDER — CEFTRIAXONE 500 MG/1
2000 INJECTION, POWDER, FOR SOLUTION INTRAMUSCULAR; INTRAVENOUS ONCE
Refills: 0 | Status: COMPLETED | OUTPATIENT
Start: 2025-01-02 | End: 2025-01-02

## 2025-01-02 RX ORDER — MAGNESIUM, ALUMINUM HYDROXIDE 200-200 MG
30 TABLET,CHEWABLE ORAL EVERY 4 HOURS
Refills: 0 | Status: DISCONTINUED | OUTPATIENT
Start: 2025-01-02 | End: 2025-01-04

## 2025-01-02 RX ORDER — ASPIRIN 325 MG
81 TABLET ORAL DAILY
Refills: 0 | Status: DISCONTINUED | OUTPATIENT
Start: 2025-01-02 | End: 2025-01-04

## 2025-01-02 RX ORDER — GLUCAGON 3 MG/1
1 POWDER NASAL ONCE
Refills: 0 | Status: DISCONTINUED | OUTPATIENT
Start: 2025-01-02 | End: 2025-01-04

## 2025-01-02 RX ORDER — DEXTROSE 50 % IN WATER 50 %
25 SYRINGE (ML) INTRAVENOUS ONCE
Refills: 0 | Status: DISCONTINUED | OUTPATIENT
Start: 2025-01-02 | End: 2025-01-04

## 2025-01-02 RX ORDER — ERGOCALCIFEROL 1.25 MG/1
1 CAPSULE ORAL
Refills: 0 | DISCHARGE

## 2025-01-02 RX ORDER — SODIUM CHLORIDE 9 G/1000ML
1000 INJECTION, SOLUTION INTRAVENOUS
Refills: 0 | Status: DISCONTINUED | OUTPATIENT
Start: 2025-01-02 | End: 2025-01-04

## 2025-01-02 RX ORDER — LEVOTHYROXINE SODIUM 300 MCG
75 TABLET ORAL DAILY
Refills: 0 | Status: DISCONTINUED | OUTPATIENT
Start: 2025-01-02 | End: 2025-01-04

## 2025-01-02 RX ORDER — DULAGLUTIDE 4.5 MG/.5ML
4.5 INJECTION, SOLUTION SUBCUTANEOUS
Refills: 0 | DISCHARGE

## 2025-01-02 RX ORDER — HEPARIN SODIUM 1000 [USP'U]/ML
5000 INJECTION INTRAVENOUS; SUBCUTANEOUS EVERY 12 HOURS
Refills: 0 | Status: DISCONTINUED | OUTPATIENT
Start: 2025-01-02 | End: 2025-01-04

## 2025-01-02 RX ORDER — ONDANSETRON HCL/PF 4 MG/2 ML
4 VIAL (ML) INJECTION EVERY 8 HOURS
Refills: 0 | Status: DISCONTINUED | OUTPATIENT
Start: 2025-01-02 | End: 2025-01-04

## 2025-01-02 RX ORDER — ACETAMINOPHEN 500 MG/5ML
1000 LIQUID (ML) ORAL ONCE
Refills: 0 | Status: COMPLETED | OUTPATIENT
Start: 2025-01-02 | End: 2025-01-02

## 2025-01-02 RX ORDER — DEXTROSE 50 % IN WATER 50 %
12.5 SYRINGE (ML) INTRAVENOUS ONCE
Refills: 0 | Status: DISCONTINUED | OUTPATIENT
Start: 2025-01-02 | End: 2025-01-04

## 2025-01-02 RX ORDER — ATORVASTATIN CALCIUM 80 MG/1
80 TABLET, FILM COATED ORAL AT BEDTIME
Refills: 0 | Status: DISCONTINUED | OUTPATIENT
Start: 2025-01-02 | End: 2025-01-04

## 2025-01-02 RX ORDER — CEFTRIAXONE 500 MG/1
1000 INJECTION, POWDER, FOR SOLUTION INTRAMUSCULAR; INTRAVENOUS EVERY 24 HOURS
Refills: 0 | Status: DISCONTINUED | OUTPATIENT
Start: 2025-01-03 | End: 2025-01-03

## 2025-01-02 RX ORDER — CEFTRIAXONE 500 MG/1
2000 INJECTION, POWDER, FOR SOLUTION INTRAMUSCULAR; INTRAVENOUS ONCE
Refills: 0 | Status: DISCONTINUED | OUTPATIENT
Start: 2025-01-02 | End: 2025-01-02

## 2025-01-02 RX ORDER — INSULIN GLARGINE-YFGN 100 [IU]/ML
0 INJECTION, SOLUTION SUBCUTANEOUS
Refills: 0 | DISCHARGE

## 2025-01-02 RX ORDER — MELATONIN 5 MG
3 TABLET ORAL AT BEDTIME
Refills: 0 | Status: DISCONTINUED | OUTPATIENT
Start: 2025-01-02 | End: 2025-01-04

## 2025-01-02 RX ORDER — CLOPIDOGREL BISULFATE 75 MG/1
75 TABLET, FILM COATED ORAL DAILY
Refills: 0 | Status: DISCONTINUED | OUTPATIENT
Start: 2025-01-02 | End: 2025-01-04

## 2025-01-02 RX ORDER — DEXTROSE 50 % IN WATER 50 %
15 SYRINGE (ML) INTRAVENOUS ONCE
Refills: 0 | Status: DISCONTINUED | OUTPATIENT
Start: 2025-01-02 | End: 2025-01-04

## 2025-01-02 RX ORDER — HYDROCHLOROTHIAZIDE 50 MG/1
1 TABLET ORAL
Refills: 0 | DISCHARGE

## 2025-01-02 RX ORDER — INSULIN ASPART 100 [IU]/ML
0 INJECTION, SOLUTION INTRAVENOUS; SUBCUTANEOUS
Refills: 0 | DISCHARGE

## 2025-01-02 RX ORDER — LOSARTAN POTASSIUM 100 MG/1
1 TABLET, FILM COATED ORAL
Refills: 0 | DISCHARGE

## 2025-01-02 RX ORDER — INSULIN GLARGINE-YFGN 100 [IU]/ML
3 INJECTION, SOLUTION SUBCUTANEOUS AT BEDTIME
Refills: 0 | Status: DISCONTINUED | OUTPATIENT
Start: 2025-01-02 | End: 2025-01-04

## 2025-01-02 RX ORDER — INSULIN LISPRO 100 U/ML
INJECTION, SOLUTION INTRAVENOUS; SUBCUTANEOUS AT BEDTIME
Refills: 0 | Status: DISCONTINUED | OUTPATIENT
Start: 2025-01-02 | End: 2025-01-04

## 2025-01-02 RX ORDER — OSELTAMIVIR PHOSPHATE 75 MG/1
30 CAPSULE ORAL
Refills: 0 | Status: DISCONTINUED | OUTPATIENT
Start: 2025-01-02 | End: 2025-01-04

## 2025-01-02 RX ADMIN — OSELTAMIVIR PHOSPHATE 75 MILLIGRAM(S): 75 CAPSULE ORAL at 14:58

## 2025-01-02 RX ADMIN — Medication 400 MILLIGRAM(S): at 19:01

## 2025-01-02 RX ADMIN — Medication 81 MILLIGRAM(S): at 18:13

## 2025-01-02 RX ADMIN — ATORVASTATIN CALCIUM 80 MILLIGRAM(S): 80 TABLET, FILM COATED ORAL at 23:04

## 2025-01-02 RX ADMIN — CEFTRIAXONE 2000 MILLIGRAM(S): 500 INJECTION, POWDER, FOR SOLUTION INTRAMUSCULAR; INTRAVENOUS at 14:49

## 2025-01-02 RX ADMIN — CLOPIDOGREL BISULFATE 75 MILLIGRAM(S): 75 TABLET, FILM COATED ORAL at 18:13

## 2025-01-02 RX ADMIN — INSULIN GLARGINE-YFGN 3 UNIT(S): 100 INJECTION, SOLUTION SUBCUTANEOUS at 23:04

## 2025-01-02 RX ADMIN — OSELTAMIVIR PHOSPHATE 30 MILLIGRAM(S): 75 CAPSULE ORAL at 23:04

## 2025-01-02 RX ADMIN — Medication 650 MILLIGRAM(S): at 16:19

## 2025-01-02 RX ADMIN — HEPARIN SODIUM 5000 UNIT(S): 1000 INJECTION INTRAVENOUS; SUBCUTANEOUS at 23:04

## 2025-01-02 RX ADMIN — Medication 1000 MILLILITER(S): at 14:36

## 2025-01-03 ENCOUNTER — RESULT REVIEW (OUTPATIENT)
Age: 70
End: 2025-01-03

## 2025-01-03 LAB
ANION GAP SERPL CALC-SCNC: 8 MMOL/L — SIGNIFICANT CHANGE UP (ref 5–17)
BUN SERPL-MCNC: 22 MG/DL — SIGNIFICANT CHANGE UP (ref 7–23)
CALCIUM SERPL-MCNC: 8.8 MG/DL — SIGNIFICANT CHANGE UP (ref 8.5–10.1)
CHLORIDE SERPL-SCNC: 106 MMOL/L — SIGNIFICANT CHANGE UP (ref 96–108)
CHOLEST SERPL-MCNC: 140 MG/DL — SIGNIFICANT CHANGE UP
CO2 SERPL-SCNC: 23 MMOL/L — SIGNIFICANT CHANGE UP (ref 22–31)
CREAT SERPL-MCNC: 1.02 MG/DL — SIGNIFICANT CHANGE UP (ref 0.5–1.3)
CULTURE RESULTS: SIGNIFICANT CHANGE UP
EGFR: 60 ML/MIN/1.73M2 — SIGNIFICANT CHANGE UP
EGFR: 60 ML/MIN/1.73M2 — SIGNIFICANT CHANGE UP
GLUCOSE BLDC GLUCOMTR-MCNC: 100 MG/DL — HIGH (ref 70–99)
GLUCOSE BLDC GLUCOMTR-MCNC: 118 MG/DL — HIGH (ref 70–99)
GLUCOSE BLDC GLUCOMTR-MCNC: 131 MG/DL — HIGH (ref 70–99)
GLUCOSE BLDC GLUCOMTR-MCNC: 99 MG/DL — SIGNIFICANT CHANGE UP (ref 70–99)
GLUCOSE SERPL-MCNC: 120 MG/DL — HIGH (ref 70–99)
HCT VFR BLD CALC: 35.7 % — SIGNIFICANT CHANGE UP (ref 34.5–45)
HDLC SERPL-MCNC: 27 MG/DL — LOW
HGB BLD-MCNC: 11.5 G/DL — SIGNIFICANT CHANGE UP (ref 11.5–15.5)
LDLC SERPL-MCNC: 85 MG/DL — SIGNIFICANT CHANGE UP
LIPID PNL WITH DIRECT LDL SERPL: 85 MG/DL — SIGNIFICANT CHANGE UP
MCHC RBC-ENTMCNC: 28.1 PG — SIGNIFICANT CHANGE UP (ref 27–34)
MCHC RBC-ENTMCNC: 32.2 G/DL — SIGNIFICANT CHANGE UP (ref 32–36)
MCV RBC AUTO: 87.3 FL — SIGNIFICANT CHANGE UP (ref 80–100)
NONHDLC SERPL-MCNC: 112 MG/DL — SIGNIFICANT CHANGE UP
PLATELET # BLD AUTO: 122 K/UL — LOW (ref 150–400)
POTASSIUM SERPL-MCNC: 3.6 MMOL/L — SIGNIFICANT CHANGE UP (ref 3.5–5.3)
POTASSIUM SERPL-SCNC: 3.6 MMOL/L — SIGNIFICANT CHANGE UP (ref 3.5–5.3)
RBC # BLD: 4.09 M/UL — SIGNIFICANT CHANGE UP (ref 3.8–5.2)
RBC # FLD: 13.2 % — SIGNIFICANT CHANGE UP (ref 10.3–14.5)
SODIUM SERPL-SCNC: 137 MMOL/L — SIGNIFICANT CHANGE UP (ref 135–145)
SPECIMEN SOURCE: SIGNIFICANT CHANGE UP
TRIGL SERPL-MCNC: 159 MG/DL — HIGH
TSH SERPL-MCNC: 2.41 UU/ML — SIGNIFICANT CHANGE UP (ref 0.34–4.82)
WBC # BLD: 5.56 K/UL — SIGNIFICANT CHANGE UP (ref 3.8–10.5)
WBC # FLD AUTO: 5.56 K/UL — SIGNIFICANT CHANGE UP (ref 3.8–10.5)

## 2025-01-03 PROCEDURE — 99233 SBSQ HOSP IP/OBS HIGH 50: CPT

## 2025-01-03 PROCEDURE — 70551 MRI BRAIN STEM W/O DYE: CPT | Mod: 26

## 2025-01-03 PROCEDURE — 93306 TTE W/DOPPLER COMPLETE: CPT | Mod: 26

## 2025-01-03 RX ORDER — PIPERACILLIN-TAZO-DEXTROSE,ISO 3.375G/5
3.38 IV SOLUTION, PIGGYBACK PREMIX FROZEN(ML) INTRAVENOUS EVERY 8 HOURS
Refills: 0 | Status: DISCONTINUED | OUTPATIENT
Start: 2025-01-03 | End: 2025-01-04

## 2025-01-03 RX ORDER — PIPERACILLIN-TAZO-DEXTROSE,ISO 3.375G/5
3.38 IV SOLUTION, PIGGYBACK PREMIX FROZEN(ML) INTRAVENOUS ONCE
Refills: 0 | Status: COMPLETED | OUTPATIENT
Start: 2025-01-03 | End: 2025-01-03

## 2025-01-03 RX ADMIN — OSELTAMIVIR PHOSPHATE 30 MILLIGRAM(S): 75 CAPSULE ORAL at 11:02

## 2025-01-03 RX ADMIN — Medication 200 GRAM(S): at 12:45

## 2025-01-03 RX ADMIN — OSELTAMIVIR PHOSPHATE 30 MILLIGRAM(S): 75 CAPSULE ORAL at 21:35

## 2025-01-03 RX ADMIN — Medication 81 MILLIGRAM(S): at 11:02

## 2025-01-03 RX ADMIN — Medication 25 GRAM(S): at 23:25

## 2025-01-03 RX ADMIN — ATORVASTATIN CALCIUM 80 MILLIGRAM(S): 80 TABLET, FILM COATED ORAL at 21:35

## 2025-01-03 RX ADMIN — HEPARIN SODIUM 5000 UNIT(S): 1000 INJECTION INTRAVENOUS; SUBCUTANEOUS at 11:02

## 2025-01-03 RX ADMIN — Medication 75 MICROGRAM(S): at 05:28

## 2025-01-03 RX ADMIN — INSULIN GLARGINE-YFGN 3 UNIT(S): 100 INJECTION, SOLUTION SUBCUTANEOUS at 21:36

## 2025-01-03 RX ADMIN — HEPARIN SODIUM 5000 UNIT(S): 1000 INJECTION INTRAVENOUS; SUBCUTANEOUS at 21:35

## 2025-01-03 RX ADMIN — CLOPIDOGREL BISULFATE 75 MILLIGRAM(S): 75 TABLET, FILM COATED ORAL at 11:02

## 2025-01-03 RX ADMIN — Medication 25 GRAM(S): at 17:49

## 2025-01-04 ENCOUNTER — TRANSCRIPTION ENCOUNTER (OUTPATIENT)
Age: 70
End: 2025-01-04

## 2025-01-04 VITALS
TEMPERATURE: 98 F | RESPIRATION RATE: 18 BRPM | HEART RATE: 90 BPM | SYSTOLIC BLOOD PRESSURE: 156 MMHG | OXYGEN SATURATION: 96 % | DIASTOLIC BLOOD PRESSURE: 81 MMHG

## 2025-01-04 LAB
A1C WITH ESTIMATED AVERAGE GLUCOSE RESULT: 8 % — HIGH (ref 4–5.6)
ANION GAP SERPL CALC-SCNC: 3 MMOL/L — LOW (ref 5–17)
BUN SERPL-MCNC: 20 MG/DL — SIGNIFICANT CHANGE UP (ref 7–23)
CALCIUM SERPL-MCNC: 8.4 MG/DL — LOW (ref 8.5–10.1)
CHLORIDE SERPL-SCNC: 107 MMOL/L — SIGNIFICANT CHANGE UP (ref 96–108)
CO2 SERPL-SCNC: 26 MMOL/L — SIGNIFICANT CHANGE UP (ref 22–31)
CREAT SERPL-MCNC: 1.08 MG/DL — SIGNIFICANT CHANGE UP (ref 0.5–1.3)
EGFR: 56 ML/MIN/1.73M2 — LOW
EGFR: 56 ML/MIN/1.73M2 — LOW
ESTIMATED AVERAGE GLUCOSE: 183 MG/DL — HIGH (ref 68–114)
GLUCOSE BLDC GLUCOMTR-MCNC: 157 MG/DL — HIGH (ref 70–99)
GLUCOSE BLDC GLUCOMTR-MCNC: 161 MG/DL — HIGH (ref 70–99)
GLUCOSE SERPL-MCNC: 155 MG/DL — HIGH (ref 70–99)
HCT VFR BLD CALC: 35.8 % — SIGNIFICANT CHANGE UP (ref 34.5–45)
HGB BLD-MCNC: 11.5 G/DL — SIGNIFICANT CHANGE UP (ref 11.5–15.5)
MCHC RBC-ENTMCNC: 28.3 PG — SIGNIFICANT CHANGE UP (ref 27–34)
MCHC RBC-ENTMCNC: 32.1 G/DL — SIGNIFICANT CHANGE UP (ref 32–36)
MCV RBC AUTO: 88.2 FL — SIGNIFICANT CHANGE UP (ref 80–100)
PLATELET # BLD AUTO: 121 K/UL — LOW (ref 150–400)
POTASSIUM SERPL-MCNC: 4.2 MMOL/L — SIGNIFICANT CHANGE UP (ref 3.5–5.3)
POTASSIUM SERPL-SCNC: 4.2 MMOL/L — SIGNIFICANT CHANGE UP (ref 3.5–5.3)
RBC # BLD: 4.06 M/UL — SIGNIFICANT CHANGE UP (ref 3.8–5.2)
RBC # FLD: 13.1 % — SIGNIFICANT CHANGE UP (ref 10.3–14.5)
SODIUM SERPL-SCNC: 136 MMOL/L — SIGNIFICANT CHANGE UP (ref 135–145)
WBC # BLD: 4.78 K/UL — SIGNIFICANT CHANGE UP (ref 3.8–10.5)
WBC # FLD AUTO: 4.78 K/UL — SIGNIFICANT CHANGE UP (ref 3.8–10.5)

## 2025-01-04 PROCEDURE — 99239 HOSP IP/OBS DSCHRG MGMT >30: CPT

## 2025-01-04 RX ORDER — OSELTAMIVIR PHOSPHATE 75 MG/1
1 CAPSULE ORAL
Qty: 0 | Refills: 0 | DISCHARGE
Start: 2025-01-04

## 2025-01-04 RX ORDER — AMOXICILLIN AND CLAVULANATE POTASSIUM 500; 125 MG/1; MG/1
875 TABLET, FILM COATED ORAL
Qty: 10 | Refills: 0
Start: 2025-01-04 | End: 2025-01-08

## 2025-01-04 RX ADMIN — Medication 25 GRAM(S): at 05:22

## 2025-01-04 RX ADMIN — INSULIN LISPRO 1: 100 INJECTION, SOLUTION INTRAVENOUS; SUBCUTANEOUS at 08:51

## 2025-01-04 RX ADMIN — Medication 1 APPLICATION(S): at 05:33

## 2025-01-04 RX ADMIN — HEPARIN SODIUM 5000 UNIT(S): 1000 INJECTION INTRAVENOUS; SUBCUTANEOUS at 09:49

## 2025-01-04 RX ADMIN — INSULIN LISPRO 1: 100 INJECTION, SOLUTION INTRAVENOUS; SUBCUTANEOUS at 12:28

## 2025-01-04 RX ADMIN — Medication 81 MILLIGRAM(S): at 09:49

## 2025-01-04 RX ADMIN — OSELTAMIVIR PHOSPHATE 30 MILLIGRAM(S): 75 CAPSULE ORAL at 09:49

## 2025-01-04 RX ADMIN — CLOPIDOGREL BISULFATE 75 MILLIGRAM(S): 75 TABLET, FILM COATED ORAL at 09:49

## 2025-01-04 RX ADMIN — Medication 75 MICROGRAM(S): at 05:22

## 2025-01-07 LAB — GLUCOSE BLDC GLUCOMTR-MCNC: 162 MG/DL — HIGH (ref 70–99)

## 2025-01-08 LAB
CULTURE RESULTS: SIGNIFICANT CHANGE UP
CULTURE RESULTS: SIGNIFICANT CHANGE UP
SPECIMEN SOURCE: SIGNIFICANT CHANGE UP
SPECIMEN SOURCE: SIGNIFICANT CHANGE UP

## 2025-01-17 DIAGNOSIS — G92.8 OTHER TOXIC ENCEPHALOPATHY: ICD-10-CM

## 2025-01-17 DIAGNOSIS — Z79.899 OTHER LONG TERM (CURRENT) DRUG THERAPY: ICD-10-CM

## 2025-01-17 DIAGNOSIS — I69.398 OTHER SEQUELAE OF CEREBRAL INFARCTION: ICD-10-CM

## 2025-01-17 DIAGNOSIS — I66.21 OCCLUSION AND STENOSIS OF RIGHT POSTERIOR CEREBRAL ARTERY: ICD-10-CM

## 2025-01-17 DIAGNOSIS — I10 ESSENTIAL (PRIMARY) HYPERTENSION: ICD-10-CM

## 2025-01-17 DIAGNOSIS — J10.08 INFLUENZA DUE TO OTHER IDENTIFIED INFLUENZA VIRUS WITH OTHER SPECIFIED PNEUMONIA: ICD-10-CM

## 2025-01-17 DIAGNOSIS — Z79.890 HORMONE REPLACEMENT THERAPY: ICD-10-CM

## 2025-01-17 DIAGNOSIS — E78.5 HYPERLIPIDEMIA, UNSPECIFIED: ICD-10-CM

## 2025-01-17 DIAGNOSIS — Z79.85 LONG-TERM (CURRENT) USE OF INJECTABLE NON-INSULIN ANTIDIABETIC DRUGS: ICD-10-CM

## 2025-01-17 DIAGNOSIS — E03.9 HYPOTHYROIDISM, UNSPECIFIED: ICD-10-CM

## 2025-01-17 DIAGNOSIS — Z95.818 PRESENCE OF OTHER CARDIAC IMPLANTS AND GRAFTS: ICD-10-CM

## 2025-01-17 DIAGNOSIS — J15.9 UNSPECIFIED BACTERIAL PNEUMONIA: ICD-10-CM

## 2025-01-17 DIAGNOSIS — Z11.52 ENCOUNTER FOR SCREENING FOR COVID-19: ICD-10-CM

## 2025-01-17 DIAGNOSIS — E11.9 TYPE 2 DIABETES MELLITUS WITHOUT COMPLICATIONS: ICD-10-CM

## 2025-01-17 DIAGNOSIS — Z79.82 LONG TERM (CURRENT) USE OF ASPIRIN: ICD-10-CM

## 2025-01-17 DIAGNOSIS — Z79.02 LONG TERM (CURRENT) USE OF ANTITHROMBOTICS/ANTIPLATELETS: ICD-10-CM
